# Patient Record
Sex: FEMALE | Race: WHITE | NOT HISPANIC OR LATINO | Employment: FULL TIME | URBAN - METROPOLITAN AREA
[De-identification: names, ages, dates, MRNs, and addresses within clinical notes are randomized per-mention and may not be internally consistent; named-entity substitution may affect disease eponyms.]

---

## 2017-09-08 ENCOUNTER — TRANSCRIBE ORDERS (OUTPATIENT)
Dept: ADMINISTRATIVE | Facility: HOSPITAL | Age: 62
End: 2017-09-08

## 2017-09-08 DIAGNOSIS — Z12.31 ENCOUNTER FOR SCREENING MAMMOGRAM FOR BREAST CANCER: Primary | ICD-10-CM

## 2017-11-03 ENCOUNTER — HOSPITAL ENCOUNTER (OUTPATIENT)
Dept: MAMMOGRAPHY | Facility: HOSPITAL | Age: 62
Discharge: HOME | End: 2017-11-03
Attending: FAMILY MEDICINE | Admitting: FAMILY MEDICINE

## 2017-11-03 DIAGNOSIS — Z12.31 ENCOUNTER FOR SCREENING MAMMOGRAM FOR BREAST CANCER: ICD-10-CM

## 2017-11-03 PROCEDURE — G0202 SCR MAMMO BI INCL CAD: HCPCS

## 2018-09-15 ENCOUNTER — HOSPITAL ENCOUNTER (OUTPATIENT)
Dept: GENERAL RADIOLOGY | Facility: HOSPITAL | Age: 63
Discharge: HOME | End: 2018-09-15
Attending: FAMILY MEDICINE | Admitting: FAMILY MEDICINE

## 2018-09-15 ENCOUNTER — TRANSCRIBE ORDERS (OUTPATIENT)
Dept: ADMINISTRATIVE | Facility: HOSPITAL | Age: 63
End: 2018-09-15

## 2018-09-15 DIAGNOSIS — M79.671 FOOT PAIN, RIGHT: Primary | ICD-10-CM

## 2018-09-15 DIAGNOSIS — M79.671 FOOT PAIN, RIGHT: ICD-10-CM

## 2018-09-15 PROCEDURE — 73630 X-RAY EXAM OF FOOT: CPT

## 2019-03-13 ENCOUNTER — TRANSCRIBE ORDERS (OUTPATIENT)
Dept: ADMINISTRATIVE | Facility: HOSPITAL | Age: 64
End: 2019-03-13

## 2019-03-13 DIAGNOSIS — Z12.31 SCREENING MAMMOGRAM, ENCOUNTER FOR: Primary | ICD-10-CM

## 2019-03-22 ENCOUNTER — APPOINTMENT (OUTPATIENT)
Dept: MAMMOGRAPHY | Facility: HOSPITAL | Age: 64
End: 2019-03-22

## 2019-03-29 ENCOUNTER — HOSPITAL ENCOUNTER (OUTPATIENT)
Dept: MAMMOGRAPHY | Facility: HOSPITAL | Age: 64
Discharge: HOME | End: 2019-03-29
Admitting: FAMILY MEDICINE

## 2019-03-29 DIAGNOSIS — Z12.31 SCREENING MAMMOGRAM, ENCOUNTER FOR: ICD-10-CM

## 2019-03-29 PROCEDURE — 77067 SCR MAMMO BI INCL CAD: CPT

## 2019-04-26 ENCOUNTER — TRANSCRIBE ORDERS (OUTPATIENT)
Dept: ADMINISTRATIVE | Facility: HOSPITAL | Age: 64
End: 2019-04-26

## 2019-04-26 DIAGNOSIS — Z78.0 MENOPAUSE: Primary | ICD-10-CM

## 2019-05-09 ENCOUNTER — APPOINTMENT (OUTPATIENT)
Dept: BONE DENSITY | Facility: HOSPITAL | Age: 64
End: 2019-05-09

## 2019-10-18 ENCOUNTER — TRANSCRIBE ORDERS (OUTPATIENT)
Dept: ADMINISTRATIVE | Facility: HOSPITAL | Age: 64
End: 2019-10-18

## 2019-10-18 DIAGNOSIS — Z78.0 MENOPAUSE: Primary | ICD-10-CM

## 2019-10-24 ENCOUNTER — HOSPITAL ENCOUNTER (OUTPATIENT)
Dept: BONE DENSITY | Facility: HOSPITAL | Age: 64
Discharge: HOME | End: 2019-10-24
Admitting: NURSE PRACTITIONER

## 2019-10-24 DIAGNOSIS — Z78.0 MENOPAUSE: ICD-10-CM

## 2019-10-24 PROCEDURE — 77080 DXA BONE DENSITY AXIAL: CPT

## 2020-11-13 ENCOUNTER — TRANSCRIBE ORDERS (OUTPATIENT)
Dept: ADMINISTRATIVE | Facility: HOSPITAL | Age: 65
End: 2020-11-13

## 2020-11-13 DIAGNOSIS — G89.4 CHRONIC PAIN SYNDROME: Primary | ICD-10-CM

## 2020-12-08 ENCOUNTER — APPOINTMENT (OUTPATIENT)
Dept: MRI IMAGING | Facility: HOSPITAL | Age: 65
End: 2020-12-08

## 2021-08-09 ENCOUNTER — TRANSCRIBE ORDERS (OUTPATIENT)
Dept: ADMINISTRATIVE | Facility: HOSPITAL | Age: 66
End: 2021-08-09

## 2021-08-09 ENCOUNTER — TRANSCRIBE ORDERS (OUTPATIENT)
Dept: BONE DENSITY | Facility: HOSPITAL | Age: 66
End: 2021-08-09

## 2021-08-09 DIAGNOSIS — Z12.31 ENCOUNTER FOR SCREENING MAMMOGRAM FOR BREAST CANCER: Primary | ICD-10-CM

## 2021-08-09 DIAGNOSIS — Z78.0 MENOPAUSE: ICD-10-CM

## 2021-10-26 ENCOUNTER — HOSPITAL ENCOUNTER (OUTPATIENT)
Dept: BONE DENSITY | Facility: HOSPITAL | Age: 66
Discharge: HOME | End: 2021-10-26

## 2021-10-26 ENCOUNTER — HOSPITAL ENCOUNTER (OUTPATIENT)
Dept: MAMMOGRAPHY | Facility: HOSPITAL | Age: 66
Discharge: HOME | End: 2021-10-26

## 2021-10-26 DIAGNOSIS — Z78.0 MENOPAUSE: ICD-10-CM

## 2021-10-26 DIAGNOSIS — Z12.31 ENCOUNTER FOR SCREENING MAMMOGRAM FOR BREAST CANCER: ICD-10-CM

## 2021-10-26 PROCEDURE — 77063 BREAST TOMOSYNTHESIS BI: CPT

## 2021-10-26 PROCEDURE — 77080 DXA BONE DENSITY AXIAL: CPT

## 2021-10-26 PROCEDURE — 77067 SCR MAMMO BI INCL CAD: CPT

## 2022-04-07 ENCOUNTER — HOSPITAL ENCOUNTER (OUTPATIENT)
Dept: GENERAL RADIOLOGY | Facility: HOSPITAL | Age: 67
Discharge: HOME | End: 2022-04-07
Admitting: FAMILY MEDICINE

## 2022-04-07 ENCOUNTER — TRANSCRIBE ORDERS (OUTPATIENT)
Dept: ADMINISTRATIVE | Facility: HOSPITAL | Age: 67
End: 2022-04-07

## 2022-04-07 DIAGNOSIS — M25.562 PAIN, JOINT, KNEE, LEFT: Primary | ICD-10-CM

## 2022-04-07 PROCEDURE — 73562 X-RAY EXAM OF KNEE 3: CPT

## 2022-04-13 ENCOUNTER — TRANSCRIBE ORDERS (OUTPATIENT)
Dept: CARDIOLOGY | Facility: HOSPITAL | Age: 67
End: 2022-04-13

## 2022-04-13 DIAGNOSIS — R94.31 ABNORMAL EKG: Primary | ICD-10-CM

## 2022-04-20 ENCOUNTER — HOSPITAL ENCOUNTER (OUTPATIENT)
Dept: CARDIOLOGY | Facility: HOSPITAL | Age: 67
End: 2022-04-20

## 2022-05-26 ENCOUNTER — APPOINTMENT (OUTPATIENT)
Dept: MRI IMAGING | Facility: HOSPITAL | Age: 67
End: 2022-05-26

## 2022-05-26 ENCOUNTER — HOSPITAL ENCOUNTER (OUTPATIENT)
Facility: HOSPITAL | Age: 67
Setting detail: OBSERVATION
Discharge: NURSING, REHAB OR LONG-TERM CARE | End: 2022-06-02
Attending: EMERGENCY MEDICINE | Admitting: FAMILY MEDICINE

## 2022-05-26 DIAGNOSIS — G89.4 CHRONIC PAIN SYNDROME: ICD-10-CM

## 2022-05-26 DIAGNOSIS — R29.898 LEG WEAKNESS, BILATERAL: ICD-10-CM

## 2022-05-26 DIAGNOSIS — R29.6 MULTIPLE FALLS: ICD-10-CM

## 2022-05-26 DIAGNOSIS — N39.0 URINARY TRACT INFECTION WITHOUT HEMATURIA, SITE UNSPECIFIED: Primary | ICD-10-CM

## 2022-05-26 LAB
ALBUMIN SERPL-MCNC: 4.7 G/DL (ref 3.5–5.2)
ALBUMIN/GLOB SERPL: 1.7 G/DL
ALP SERPL-CCNC: 78 U/L (ref 39–117)
ALT SERPL W P-5'-P-CCNC: 16 U/L (ref 1–33)
AMPHET+METHAMPHET UR QL: NEGATIVE
AMPHETAMINES UR QL: NEGATIVE
ANION GAP SERPL CALCULATED.3IONS-SCNC: 14.9 MMOL/L (ref 5–15)
AST SERPL-CCNC: 21 U/L (ref 1–32)
BACTERIA UR QL AUTO: ABNORMAL /HPF
BARBITURATES UR QL SCN: NEGATIVE
BASOPHILS # BLD AUTO: 0.06 10*3/MM3 (ref 0–0.2)
BASOPHILS NFR BLD AUTO: 0.5 % (ref 0–1.5)
BENZODIAZ UR QL SCN: NEGATIVE
BILIRUB SERPL-MCNC: 0.5 MG/DL (ref 0–1.2)
BILIRUB UR QL STRIP: NEGATIVE
BUN SERPL-MCNC: 34 MG/DL (ref 8–23)
BUN/CREAT SERPL: 25.2 (ref 7–25)
BUPRENORPHINE SERPL-MCNC: NEGATIVE NG/ML
CALCIUM SPEC-SCNC: 10.1 MG/DL (ref 8.6–10.5)
CANNABINOIDS SERPL QL: POSITIVE
CHLORIDE SERPL-SCNC: 97 MMOL/L (ref 98–107)
CK SERPL-CCNC: 194 U/L (ref 20–180)
CLARITY UR: ABNORMAL
CO2 SERPL-SCNC: 25.1 MMOL/L (ref 22–29)
COCAINE UR QL: NEGATIVE
COLOR UR: YELLOW
CREAT SERPL-MCNC: 1.35 MG/DL (ref 0.57–1)
DEPRECATED RDW RBC AUTO: 43.8 FL (ref 37–54)
EGFRCR SERPLBLD CKD-EPI 2021: 44 ML/MIN/1.73
EOSINOPHIL # BLD AUTO: 0.48 10*3/MM3 (ref 0–0.4)
EOSINOPHIL NFR BLD AUTO: 4.4 % (ref 0.3–6.2)
ERYTHROCYTE [DISTWIDTH] IN BLOOD BY AUTOMATED COUNT: 14 % (ref 12.3–15.4)
GLOBULIN UR ELPH-MCNC: 2.8 GM/DL
GLUCOSE BLDC GLUCOMTR-MCNC: 94 MG/DL (ref 70–130)
GLUCOSE BLDC GLUCOMTR-MCNC: 97 MG/DL (ref 70–130)
GLUCOSE SERPL-MCNC: 116 MG/DL (ref 65–99)
GLUCOSE UR STRIP-MCNC: NEGATIVE MG/DL
HCT VFR BLD AUTO: 39.3 % (ref 34–46.6)
HGB BLD-MCNC: 12.8 G/DL (ref 12–15.9)
HGB UR QL STRIP.AUTO: ABNORMAL
HYALINE CASTS UR QL AUTO: ABNORMAL /LPF
IMM GRANULOCYTES # BLD AUTO: 0.03 10*3/MM3 (ref 0–0.05)
IMM GRANULOCYTES NFR BLD AUTO: 0.3 % (ref 0–0.5)
KETONES UR QL STRIP: NEGATIVE
LEUKOCYTE ESTERASE UR QL STRIP.AUTO: ABNORMAL
LYMPHOCYTES # BLD AUTO: 2.78 10*3/MM3 (ref 0.7–3.1)
LYMPHOCYTES NFR BLD AUTO: 25.3 % (ref 19.6–45.3)
MCH RBC QN AUTO: 28.3 PG (ref 26.6–33)
MCHC RBC AUTO-ENTMCNC: 32.6 G/DL (ref 31.5–35.7)
MCV RBC AUTO: 86.8 FL (ref 79–97)
METHADONE UR QL SCN: NEGATIVE
MONOCYTES # BLD AUTO: 0.94 10*3/MM3 (ref 0.1–0.9)
MONOCYTES NFR BLD AUTO: 8.6 % (ref 5–12)
NEUTROPHILS NFR BLD AUTO: 6.7 10*3/MM3 (ref 1.7–7)
NEUTROPHILS NFR BLD AUTO: 60.9 % (ref 42.7–76)
NITRITE UR QL STRIP: NEGATIVE
NRBC BLD AUTO-RTO: 0 /100 WBC (ref 0–0.2)
OPIATES UR QL: POSITIVE
OXYCODONE UR QL SCN: NEGATIVE
PCP UR QL SCN: NEGATIVE
PH UR STRIP.AUTO: 5.5 [PH] (ref 4.5–8)
PLATELET # BLD AUTO: 253 10*3/MM3 (ref 140–450)
PMV BLD AUTO: 8.9 FL (ref 6–12)
POTASSIUM SERPL-SCNC: 3.2 MMOL/L (ref 3.5–5.2)
PROCALCITONIN SERPL-MCNC: 0.08 NG/ML (ref 0–0.25)
PROPOXYPH UR QL: NEGATIVE
PROT SERPL-MCNC: 7.5 G/DL (ref 6–8.5)
PROT UR QL STRIP: NEGATIVE
RBC # BLD AUTO: 4.53 10*6/MM3 (ref 3.77–5.28)
RBC # UR STRIP: ABNORMAL /HPF
REF LAB TEST METHOD: ABNORMAL
SARS-COV-2 RNA PNL SPEC NAA+PROBE: NOT DETECTED
SODIUM SERPL-SCNC: 137 MMOL/L (ref 136–145)
SP GR UR STRIP: 1.02 (ref 1–1.03)
SQUAMOUS #/AREA URNS HPF: ABNORMAL /HPF
TRICYCLICS UR QL SCN: POSITIVE
TROPONIN T SERPL-MCNC: <0.01 NG/ML (ref 0–0.03)
TSH SERPL DL<=0.05 MIU/L-ACNC: 6.7 UIU/ML (ref 0.27–4.2)
UROBILINOGEN UR QL STRIP: ABNORMAL
WBC # UR STRIP: ABNORMAL /HPF
WBC NRBC COR # BLD: 10.99 10*3/MM3 (ref 3.4–10.8)

## 2022-05-26 PROCEDURE — 25010000002 LORAZEPAM PER 2 MG

## 2022-05-26 PROCEDURE — 99284 EMERGENCY DEPT VISIT MOD MDM: CPT

## 2022-05-26 PROCEDURE — 93010 ELECTROCARDIOGRAM REPORT: CPT | Performed by: INTERNAL MEDICINE

## 2022-05-26 PROCEDURE — 96361 HYDRATE IV INFUSION ADD-ON: CPT

## 2022-05-26 PROCEDURE — 85025 COMPLETE CBC W/AUTO DIFF WBC: CPT | Performed by: EMERGENCY MEDICINE

## 2022-05-26 PROCEDURE — 87635 SARS-COV-2 COVID-19 AMP PRB: CPT | Performed by: EMERGENCY MEDICINE

## 2022-05-26 PROCEDURE — 82962 GLUCOSE BLOOD TEST: CPT

## 2022-05-26 PROCEDURE — C9803 HOPD COVID-19 SPEC COLLECT: HCPCS

## 2022-05-26 PROCEDURE — 25010000002 CEFTRIAXONE SODIUM-DEXTROSE 1-3.74 GM-%(50ML) RECONSTITUTED SOLUTION: Performed by: EMERGENCY MEDICINE

## 2022-05-26 PROCEDURE — G0378 HOSPITAL OBSERVATION PER HR: HCPCS

## 2022-05-26 PROCEDURE — 96375 TX/PRO/DX INJ NEW DRUG ADDON: CPT

## 2022-05-26 PROCEDURE — 81001 URINALYSIS AUTO W/SCOPE: CPT | Performed by: EMERGENCY MEDICINE

## 2022-05-26 PROCEDURE — 80306 DRUG TEST PRSMV INSTRMNT: CPT | Performed by: EMERGENCY MEDICINE

## 2022-05-26 PROCEDURE — 97161 PT EVAL LOW COMPLEX 20 MIN: CPT

## 2022-05-26 PROCEDURE — 82550 ASSAY OF CK (CPK): CPT | Performed by: EMERGENCY MEDICINE

## 2022-05-26 PROCEDURE — 87186 SC STD MICRODIL/AGAR DIL: CPT | Performed by: FAMILY MEDICINE

## 2022-05-26 PROCEDURE — 96372 THER/PROPH/DIAG INJ SC/IM: CPT

## 2022-05-26 PROCEDURE — 84145 PROCALCITONIN (PCT): CPT | Performed by: EMERGENCY MEDICINE

## 2022-05-26 PROCEDURE — 96365 THER/PROPH/DIAG IV INF INIT: CPT

## 2022-05-26 PROCEDURE — 72148 MRI LUMBAR SPINE W/O DYE: CPT

## 2022-05-26 PROCEDURE — 99283 EMERGENCY DEPT VISIT LOW MDM: CPT | Performed by: EMERGENCY MEDICINE

## 2022-05-26 PROCEDURE — 99204 OFFICE O/P NEW MOD 45 MIN: CPT | Performed by: PSYCHIATRY & NEUROLOGY

## 2022-05-26 PROCEDURE — 25010000002 ENOXAPARIN PER 10 MG: Performed by: FAMILY MEDICINE

## 2022-05-26 PROCEDURE — 97165 OT EVAL LOW COMPLEX 30 MIN: CPT

## 2022-05-26 PROCEDURE — 93005 ELECTROCARDIOGRAM TRACING: CPT | Performed by: EMERGENCY MEDICINE

## 2022-05-26 PROCEDURE — 80053 COMPREHEN METABOLIC PANEL: CPT | Performed by: EMERGENCY MEDICINE

## 2022-05-26 PROCEDURE — 87086 URINE CULTURE/COLONY COUNT: CPT | Performed by: FAMILY MEDICINE

## 2022-05-26 PROCEDURE — 84443 ASSAY THYROID STIM HORMONE: CPT | Performed by: FAMILY MEDICINE

## 2022-05-26 PROCEDURE — 96376 TX/PRO/DX INJ SAME DRUG ADON: CPT

## 2022-05-26 PROCEDURE — 84484 ASSAY OF TROPONIN QUANT: CPT | Performed by: EMERGENCY MEDICINE

## 2022-05-26 RX ORDER — ONDANSETRON 2 MG/ML
4 INJECTION INTRAMUSCULAR; INTRAVENOUS EVERY 6 HOURS PRN
Status: DISCONTINUED | OUTPATIENT
Start: 2022-05-26 | End: 2022-06-02 | Stop reason: HOSPADM

## 2022-05-26 RX ORDER — MAGNESIUM CARB/ALUMINUM HYDROX 105-160MG
TABLET,CHEWABLE ORAL ONCE
COMMUNITY
End: 2022-05-26

## 2022-05-26 RX ORDER — CHLORAL HYDRATE 500 MG
1 CAPSULE ORAL
COMMUNITY

## 2022-05-26 RX ORDER — OXYBUTYNIN CHLORIDE 5 MG/1
5 TABLET ORAL 4 TIMES DAILY
COMMUNITY

## 2022-05-26 RX ORDER — LEVOTHYROXINE SODIUM 0.1 MG/1
150 TABLET ORAL DAILY
COMMUNITY

## 2022-05-26 RX ORDER — LORAZEPAM 2 MG/ML
1 INJECTION INTRAMUSCULAR ONCE
Status: COMPLETED | OUTPATIENT
Start: 2022-05-26 | End: 2022-05-26

## 2022-05-26 RX ORDER — HYDROCODONE BITARTRATE AND ACETAMINOPHEN 7.5; 325 MG/1; MG/1
1 TABLET ORAL EVERY 8 HOURS PRN
Status: ON HOLD | COMMUNITY
End: 2022-06-02 | Stop reason: SDUPTHER

## 2022-05-26 RX ORDER — METFORMIN HYDROCHLORIDE 750 MG/1
750 TABLET, EXTENDED RELEASE ORAL
COMMUNITY

## 2022-05-26 RX ORDER — ACETAMINOPHEN 650 MG/1
650 SUPPOSITORY RECTAL EVERY 4 HOURS PRN
Status: DISCONTINUED | OUTPATIENT
Start: 2022-05-26 | End: 2022-06-02 | Stop reason: HOSPADM

## 2022-05-26 RX ORDER — CEFTRIAXONE 1 G/50ML
1 INJECTION, SOLUTION INTRAVENOUS ONCE
Status: COMPLETED | OUTPATIENT
Start: 2022-05-26 | End: 2022-05-26

## 2022-05-26 RX ORDER — CLONAZEPAM 0.5 MG/1
0.5 TABLET ORAL 2 TIMES DAILY
Status: COMPLETED | OUTPATIENT
Start: 2022-05-26 | End: 2022-06-02

## 2022-05-26 RX ORDER — ACETAMINOPHEN 160 MG/5ML
650 SOLUTION ORAL EVERY 4 HOURS PRN
Status: DISCONTINUED | OUTPATIENT
Start: 2022-05-26 | End: 2022-06-02 | Stop reason: HOSPADM

## 2022-05-26 RX ORDER — OXYBUTYNIN CHLORIDE 5 MG/1
15 TABLET, EXTENDED RELEASE ORAL NIGHTLY
Status: DISCONTINUED | OUTPATIENT
Start: 2022-05-26 | End: 2022-06-02 | Stop reason: HOSPADM

## 2022-05-26 RX ORDER — OXYBUTYNIN CHLORIDE 5 MG/1
5 TABLET ORAL 4 TIMES DAILY
Status: DISCONTINUED | OUTPATIENT
Start: 2022-05-26 | End: 2022-05-26

## 2022-05-26 RX ORDER — HYDROCODONE BITARTRATE AND ACETAMINOPHEN 7.5; 325 MG/1; MG/1
1 TABLET ORAL EVERY 8 HOURS PRN
Status: DISCONTINUED | OUTPATIENT
Start: 2022-05-26 | End: 2022-06-02 | Stop reason: HOSPADM

## 2022-05-26 RX ORDER — SODIUM CHLORIDE 0.9 % (FLUSH) 0.9 %
10 SYRINGE (ML) INJECTION AS NEEDED
Status: DISCONTINUED | OUTPATIENT
Start: 2022-05-26 | End: 2022-06-02 | Stop reason: HOSPADM

## 2022-05-26 RX ORDER — ATENOLOL 50 MG/1
50 TABLET ORAL DAILY
COMMUNITY

## 2022-05-26 RX ORDER — MULTIVITAMIN WITH IRON
1 TABLET ORAL
COMMUNITY

## 2022-05-26 RX ORDER — ASPIRIN 81 MG/1
81 TABLET, CHEWABLE ORAL DAILY
Status: DISCONTINUED | OUTPATIENT
Start: 2022-05-26 | End: 2022-06-02 | Stop reason: HOSPADM

## 2022-05-26 RX ORDER — LORAZEPAM 2 MG/ML
INJECTION INTRAMUSCULAR
Status: COMPLETED
Start: 2022-05-26 | End: 2022-05-26

## 2022-05-26 RX ORDER — SODIUM CHLORIDE 0.9 % (FLUSH) 0.9 %
1-10 SYRINGE (ML) INJECTION AS NEEDED
Status: DISCONTINUED | OUTPATIENT
Start: 2022-05-26 | End: 2022-06-02 | Stop reason: HOSPADM

## 2022-05-26 RX ORDER — CHOLECALCIFEROL (VITAMIN D3) 125 MCG
5 CAPSULE ORAL NIGHTLY PRN
Status: DISCONTINUED | OUTPATIENT
Start: 2022-05-26 | End: 2022-06-02 | Stop reason: HOSPADM

## 2022-05-26 RX ORDER — CHLORTHALIDONE 25 MG/1
25 TABLET ORAL DAILY
COMMUNITY
End: 2022-06-02 | Stop reason: HOSPADM

## 2022-05-26 RX ORDER — CYCLOBENZAPRINE HCL 10 MG
10 TABLET ORAL 3 TIMES DAILY PRN
COMMUNITY
End: 2022-08-29

## 2022-05-26 RX ORDER — CYCLOBENZAPRINE HCL 10 MG
10 TABLET ORAL 3 TIMES DAILY PRN
Status: DISCONTINUED | OUTPATIENT
Start: 2022-05-26 | End: 2022-06-02 | Stop reason: HOSPADM

## 2022-05-26 RX ORDER — GABAPENTIN 300 MG/1
300 CAPSULE ORAL 2 TIMES DAILY
Status: DISCONTINUED | OUTPATIENT
Start: 2022-05-26 | End: 2022-05-26

## 2022-05-26 RX ORDER — UBIDECARENONE 100 MG
100 CAPSULE ORAL DAILY
COMMUNITY

## 2022-05-26 RX ORDER — ONDANSETRON 4 MG/1
4 TABLET, FILM COATED ORAL EVERY 6 HOURS PRN
Status: DISCONTINUED | OUTPATIENT
Start: 2022-05-26 | End: 2022-06-02 | Stop reason: HOSPADM

## 2022-05-26 RX ORDER — ENOXAPARIN SODIUM 100 MG/ML
40 INJECTION SUBCUTANEOUS EVERY 24 HOURS
Status: DISCONTINUED | OUTPATIENT
Start: 2022-05-26 | End: 2022-06-02 | Stop reason: HOSPADM

## 2022-05-26 RX ORDER — GABAPENTIN 300 MG/1
600 CAPSULE ORAL NIGHTLY
Status: DISCONTINUED | OUTPATIENT
Start: 2022-05-26 | End: 2022-06-02 | Stop reason: HOSPADM

## 2022-05-26 RX ORDER — ATENOLOL 50 MG/1
50 TABLET ORAL DAILY
Status: DISCONTINUED | OUTPATIENT
Start: 2022-05-26 | End: 2022-06-02 | Stop reason: HOSPADM

## 2022-05-26 RX ORDER — ACETAMINOPHEN 325 MG/1
650 TABLET ORAL EVERY 4 HOURS PRN
Status: DISCONTINUED | OUTPATIENT
Start: 2022-05-26 | End: 2022-06-02 | Stop reason: HOSPADM

## 2022-05-26 RX ORDER — SODIUM CHLORIDE 9 MG/ML
40 INJECTION, SOLUTION INTRAVENOUS AS NEEDED
Status: DISCONTINUED | OUTPATIENT
Start: 2022-05-26 | End: 2022-06-02 | Stop reason: HOSPADM

## 2022-05-26 RX ORDER — POTASSIUM CHLORIDE 20 MEQ/1
40 TABLET, EXTENDED RELEASE ORAL ONCE
Status: COMPLETED | OUTPATIENT
Start: 2022-05-26 | End: 2022-05-26

## 2022-05-26 RX ORDER — SODIUM CHLORIDE 0.9 % (FLUSH) 0.9 %
10 SYRINGE (ML) INJECTION EVERY 12 HOURS SCHEDULED
Status: DISCONTINUED | OUTPATIENT
Start: 2022-05-26 | End: 2022-06-02 | Stop reason: HOSPADM

## 2022-05-26 RX ORDER — GABAPENTIN 300 MG/1
300 CAPSULE ORAL 2 TIMES DAILY
COMMUNITY
End: 2022-06-02 | Stop reason: HOSPADM

## 2022-05-26 RX ORDER — LEVOTHYROXINE SODIUM 0.15 MG/1
150 TABLET ORAL DAILY
Status: DISCONTINUED | OUTPATIENT
Start: 2022-05-26 | End: 2022-06-02 | Stop reason: HOSPADM

## 2022-05-26 RX ORDER — ASPIRIN 81 MG/1
81 TABLET, CHEWABLE ORAL DAILY
COMMUNITY

## 2022-05-26 RX ORDER — ALUMINA, MAGNESIA, AND SIMETHICONE 2400; 2400; 240 MG/30ML; MG/30ML; MG/30ML
15 SUSPENSION ORAL EVERY 6 HOURS PRN
Status: DISCONTINUED | OUTPATIENT
Start: 2022-05-26 | End: 2022-06-02 | Stop reason: HOSPADM

## 2022-05-26 RX ORDER — AMOXICILLIN 250 MG
2 CAPSULE ORAL 2 TIMES DAILY
Status: DISCONTINUED | OUTPATIENT
Start: 2022-05-26 | End: 2022-06-02 | Stop reason: HOSPADM

## 2022-05-26 RX ORDER — POLYETHYLENE GLYCOL 3350 17 G/17G
17 POWDER, FOR SOLUTION ORAL DAILY PRN
Status: DISCONTINUED | OUTPATIENT
Start: 2022-05-26 | End: 2022-06-02 | Stop reason: HOSPADM

## 2022-05-26 RX ORDER — NAPROXEN SODIUM 220 MG
220 TABLET ORAL 2 TIMES DAILY PRN
COMMUNITY

## 2022-05-26 RX ORDER — BISACODYL 5 MG/1
5 TABLET, DELAYED RELEASE ORAL DAILY PRN
Status: DISCONTINUED | OUTPATIENT
Start: 2022-05-26 | End: 2022-06-02 | Stop reason: HOSPADM

## 2022-05-26 RX ORDER — CEFTRIAXONE 1 G/50ML
1 INJECTION, SOLUTION INTRAVENOUS EVERY 24 HOURS
Status: DISCONTINUED | OUTPATIENT
Start: 2022-05-27 | End: 2022-05-28

## 2022-05-26 RX ORDER — BISACODYL 10 MG
10 SUPPOSITORY, RECTAL RECTAL DAILY PRN
Status: DISCONTINUED | OUTPATIENT
Start: 2022-05-26 | End: 2022-06-02 | Stop reason: HOSPADM

## 2022-05-26 RX ORDER — METFORMIN HYDROCHLORIDE 750 MG/1
750 TABLET, EXTENDED RELEASE ORAL
Status: DISCONTINUED | OUTPATIENT
Start: 2022-05-26 | End: 2022-06-02 | Stop reason: HOSPADM

## 2022-05-26 RX ORDER — SODIUM CHLORIDE 9 MG/ML
100 INJECTION, SOLUTION INTRAVENOUS CONTINUOUS
Status: DISCONTINUED | OUTPATIENT
Start: 2022-05-26 | End: 2022-05-27

## 2022-05-26 RX ADMIN — CYCLOBENZAPRINE 10 MG: 10 TABLET, FILM COATED ORAL at 16:39

## 2022-05-26 RX ADMIN — CEFTRIAXONE 1 G: 1 INJECTION, SOLUTION INTRAVENOUS at 05:48

## 2022-05-26 RX ADMIN — SENNOSIDES AND DOCUSATE SODIUM 2 TABLET: 50; 8.6 TABLET ORAL at 23:31

## 2022-05-26 RX ADMIN — ENOXAPARIN SODIUM 40 MG: 40 INJECTION SUBCUTANEOUS at 10:24

## 2022-05-26 RX ADMIN — CEFTRIAXONE 1 G: 1 INJECTION, SOLUTION INTRAVENOUS at 23:28

## 2022-05-26 RX ADMIN — SODIUM CHLORIDE 100 ML/HR: 9 INJECTION, SOLUTION INTRAVENOUS at 22:40

## 2022-05-26 RX ADMIN — LORAZEPAM 1 MG: 2 INJECTION INTRAMUSCULAR at 15:49

## 2022-05-26 RX ADMIN — HYDROCODONE BITARTRATE AND ACETAMINOPHEN 1 TABLET: 7.5; 325 TABLET ORAL at 08:28

## 2022-05-26 RX ADMIN — GABAPENTIN 600 MG: 300 CAPSULE ORAL at 23:31

## 2022-05-26 RX ADMIN — ASPIRIN 81 MG CHEWABLE TABLET 81 MG: 81 TABLET CHEWABLE at 10:24

## 2022-05-26 RX ADMIN — SENNOSIDES AND DOCUSATE SODIUM 2 TABLET: 50; 8.6 TABLET ORAL at 10:24

## 2022-05-26 RX ADMIN — HYDROCODONE BITARTRATE AND ACETAMINOPHEN 1 TABLET: 7.5; 325 TABLET ORAL at 16:39

## 2022-05-26 RX ADMIN — SODIUM CHLORIDE 100 ML/HR: 9 INJECTION, SOLUTION INTRAVENOUS at 10:24

## 2022-05-26 RX ADMIN — OXYBUTYNIN CHLORIDE 15 MG: 5 TABLET, EXTENDED RELEASE ORAL at 23:31

## 2022-05-26 RX ADMIN — LORAZEPAM 1 MG: 2 INJECTION INTRAMUSCULAR; INTRAVENOUS at 15:49

## 2022-05-26 RX ADMIN — CLONAZEPAM 0.5 MG: 0.5 TABLET ORAL at 23:31

## 2022-05-26 RX ADMIN — ATENOLOL 50 MG: 50 TABLET ORAL at 10:23

## 2022-05-26 RX ADMIN — METFORMIN HYDROCHLORIDE 750 MG: 750 TABLET ORAL at 10:24

## 2022-05-26 RX ADMIN — POTASSIUM CHLORIDE 40 MEQ: 1500 TABLET, EXTENDED RELEASE ORAL at 10:23

## 2022-05-26 RX ADMIN — Medication 10 ML: at 10:25

## 2022-05-26 RX ADMIN — LEVOTHYROXINE SODIUM 150 MCG: 150 TABLET ORAL at 10:24

## 2022-05-26 RX ADMIN — CYCLOBENZAPRINE 10 MG: 10 TABLET, FILM COATED ORAL at 08:59

## 2022-05-27 ENCOUNTER — APPOINTMENT (OUTPATIENT)
Dept: MRI IMAGING | Facility: HOSPITAL | Age: 67
End: 2022-05-27

## 2022-05-27 LAB
ANION GAP SERPL CALCULATED.3IONS-SCNC: 11.9 MMOL/L (ref 5–15)
BUN SERPL-MCNC: 28 MG/DL (ref 8–23)
BUN/CREAT SERPL: 22.4 (ref 7–25)
CALCIUM SPEC-SCNC: 9 MG/DL (ref 8.6–10.5)
CHLORIDE SERPL-SCNC: 105 MMOL/L (ref 98–107)
CO2 SERPL-SCNC: 24.1 MMOL/L (ref 22–29)
CREAT SERPL-MCNC: 1.25 MG/DL (ref 0.57–1)
DEPRECATED RDW RBC AUTO: 45.3 FL (ref 37–54)
EGFRCR SERPLBLD CKD-EPI 2021: 48.2 ML/MIN/1.73
ERYTHROCYTE [DISTWIDTH] IN BLOOD BY AUTOMATED COUNT: 14.1 % (ref 12.3–15.4)
GLUCOSE BLDC GLUCOMTR-MCNC: 81 MG/DL (ref 70–130)
GLUCOSE BLDC GLUCOMTR-MCNC: 91 MG/DL (ref 70–130)
GLUCOSE BLDC GLUCOMTR-MCNC: 99 MG/DL (ref 70–130)
GLUCOSE SERPL-MCNC: 103 MG/DL (ref 65–99)
HCT VFR BLD AUTO: 35.1 % (ref 34–46.6)
HGB BLD-MCNC: 11 G/DL (ref 12–15.9)
MCH RBC QN AUTO: 27.8 PG (ref 26.6–33)
MCHC RBC AUTO-ENTMCNC: 31.3 G/DL (ref 31.5–35.7)
MCV RBC AUTO: 88.9 FL (ref 79–97)
PLATELET # BLD AUTO: 204 10*3/MM3 (ref 140–450)
PMV BLD AUTO: 9.2 FL (ref 6–12)
POTASSIUM SERPL-SCNC: 3.6 MMOL/L (ref 3.5–5.2)
RBC # BLD AUTO: 3.95 10*6/MM3 (ref 3.77–5.28)
SODIUM SERPL-SCNC: 141 MMOL/L (ref 136–145)
WBC NRBC COR # BLD: 6.24 10*3/MM3 (ref 3.4–10.8)

## 2022-05-27 PROCEDURE — 80048 BASIC METABOLIC PNL TOTAL CA: CPT | Performed by: FAMILY MEDICINE

## 2022-05-27 PROCEDURE — 25010000002 CEFTRIAXONE SODIUM-DEXTROSE 1-3.74 GM-%(50ML) RECONSTITUTED SOLUTION: Performed by: FAMILY MEDICINE

## 2022-05-27 PROCEDURE — 97530 THERAPEUTIC ACTIVITIES: CPT

## 2022-05-27 PROCEDURE — 96372 THER/PROPH/DIAG INJ SC/IM: CPT

## 2022-05-27 PROCEDURE — G0378 HOSPITAL OBSERVATION PER HR: HCPCS

## 2022-05-27 PROCEDURE — 82962 GLUCOSE BLOOD TEST: CPT

## 2022-05-27 PROCEDURE — 25010000002 LORAZEPAM PER 2 MG: Performed by: PSYCHIATRY & NEUROLOGY

## 2022-05-27 PROCEDURE — 96361 HYDRATE IV INFUSION ADD-ON: CPT

## 2022-05-27 PROCEDURE — 97110 THERAPEUTIC EXERCISES: CPT

## 2022-05-27 PROCEDURE — 25010000002 LORAZEPAM PER 2 MG: Performed by: FAMILY MEDICINE

## 2022-05-27 PROCEDURE — 25010000002 ENOXAPARIN PER 10 MG: Performed by: FAMILY MEDICINE

## 2022-05-27 PROCEDURE — 96366 THER/PROPH/DIAG IV INF ADDON: CPT

## 2022-05-27 PROCEDURE — 72146 MRI CHEST SPINE W/O DYE: CPT

## 2022-05-27 PROCEDURE — 96376 TX/PRO/DX INJ SAME DRUG ADON: CPT

## 2022-05-27 PROCEDURE — 85027 COMPLETE CBC AUTOMATED: CPT | Performed by: FAMILY MEDICINE

## 2022-05-27 RX ORDER — LORAZEPAM 2 MG/ML
1 INJECTION INTRAMUSCULAR ONCE
Status: COMPLETED | OUTPATIENT
Start: 2022-05-27 | End: 2022-05-27

## 2022-05-27 RX ADMIN — LEVOTHYROXINE SODIUM 150 MCG: 150 TABLET ORAL at 08:46

## 2022-05-27 RX ADMIN — LORAZEPAM 1 MG: 2 INJECTION INTRAMUSCULAR; INTRAVENOUS at 11:35

## 2022-05-27 RX ADMIN — LORAZEPAM 1 MG: 2 INJECTION INTRAMUSCULAR; INTRAVENOUS at 10:55

## 2022-05-27 RX ADMIN — SENNOSIDES AND DOCUSATE SODIUM 2 TABLET: 50; 8.6 TABLET ORAL at 20:09

## 2022-05-27 RX ADMIN — HYDROCODONE BITARTRATE AND ACETAMINOPHEN 1 TABLET: 7.5; 325 TABLET ORAL at 10:34

## 2022-05-27 RX ADMIN — ASPIRIN 81 MG CHEWABLE TABLET 81 MG: 81 TABLET CHEWABLE at 08:46

## 2022-05-27 RX ADMIN — CLONAZEPAM 0.5 MG: 0.5 TABLET ORAL at 08:46

## 2022-05-27 RX ADMIN — SODIUM CHLORIDE 100 ML/HR: 9 INJECTION, SOLUTION INTRAVENOUS at 05:57

## 2022-05-27 RX ADMIN — HYDROCODONE BITARTRATE AND ACETAMINOPHEN 1 TABLET: 7.5; 325 TABLET ORAL at 00:15

## 2022-05-27 RX ADMIN — GABAPENTIN 600 MG: 300 CAPSULE ORAL at 20:09

## 2022-05-27 RX ADMIN — Medication 10 ML: at 20:09

## 2022-05-27 RX ADMIN — SENNOSIDES AND DOCUSATE SODIUM 2 TABLET: 50; 8.6 TABLET ORAL at 08:46

## 2022-05-27 RX ADMIN — METFORMIN HYDROCHLORIDE 750 MG: 750 TABLET ORAL at 08:46

## 2022-05-27 RX ADMIN — Medication 10 ML: at 08:47

## 2022-05-27 RX ADMIN — ATENOLOL 50 MG: 50 TABLET ORAL at 08:46

## 2022-05-27 RX ADMIN — CEFTRIAXONE 1 G: 1 INJECTION, SOLUTION INTRAVENOUS at 23:24

## 2022-05-27 RX ADMIN — ENOXAPARIN SODIUM 40 MG: 40 INJECTION SUBCUTANEOUS at 06:03

## 2022-05-27 RX ADMIN — OXYBUTYNIN CHLORIDE 15 MG: 5 TABLET, EXTENDED RELEASE ORAL at 20:09

## 2022-05-27 RX ADMIN — CLONAZEPAM 0.5 MG: 0.5 TABLET ORAL at 20:09

## 2022-05-28 LAB
BACTERIA SPEC AEROBE CULT: ABNORMAL
GLUCOSE BLDC GLUCOMTR-MCNC: 106 MG/DL (ref 70–130)
GLUCOSE BLDC GLUCOMTR-MCNC: 107 MG/DL (ref 70–130)
GLUCOSE BLDC GLUCOMTR-MCNC: 108 MG/DL (ref 70–130)

## 2022-05-28 PROCEDURE — G0378 HOSPITAL OBSERVATION PER HR: HCPCS

## 2022-05-28 PROCEDURE — 96372 THER/PROPH/DIAG INJ SC/IM: CPT

## 2022-05-28 PROCEDURE — 97116 GAIT TRAINING THERAPY: CPT

## 2022-05-28 PROCEDURE — 25010000002 ENOXAPARIN PER 10 MG: Performed by: FAMILY MEDICINE

## 2022-05-28 PROCEDURE — 82962 GLUCOSE BLOOD TEST: CPT

## 2022-05-28 RX ORDER — CEPHALEXIN 500 MG/1
500 CAPSULE ORAL 3 TIMES DAILY
Status: COMPLETED | OUTPATIENT
Start: 2022-05-28 | End: 2022-06-02

## 2022-05-28 RX ADMIN — Medication 10 ML: at 08:12

## 2022-05-28 RX ADMIN — LEVOTHYROXINE SODIUM 150 MCG: 150 TABLET ORAL at 08:11

## 2022-05-28 RX ADMIN — CYCLOBENZAPRINE 10 MG: 10 TABLET, FILM COATED ORAL at 18:08

## 2022-05-28 RX ADMIN — HYDROCODONE BITARTRATE AND ACETAMINOPHEN 1 TABLET: 7.5; 325 TABLET ORAL at 11:53

## 2022-05-28 RX ADMIN — GABAPENTIN 600 MG: 300 CAPSULE ORAL at 20:11

## 2022-05-28 RX ADMIN — CEPHALEXIN 500 MG: 500 CAPSULE ORAL at 20:11

## 2022-05-28 RX ADMIN — OXYBUTYNIN CHLORIDE 15 MG: 5 TABLET, EXTENDED RELEASE ORAL at 20:11

## 2022-05-28 RX ADMIN — CLONAZEPAM 0.5 MG: 0.5 TABLET ORAL at 08:11

## 2022-05-28 RX ADMIN — SENNOSIDES AND DOCUSATE SODIUM 2 TABLET: 50; 8.6 TABLET ORAL at 20:11

## 2022-05-28 RX ADMIN — HYDROCODONE BITARTRATE AND ACETAMINOPHEN 1 TABLET: 7.5; 325 TABLET ORAL at 03:39

## 2022-05-28 RX ADMIN — ENOXAPARIN SODIUM 40 MG: 40 INJECTION SUBCUTANEOUS at 08:11

## 2022-05-28 RX ADMIN — HYDROCODONE BITARTRATE AND ACETAMINOPHEN 1 TABLET: 7.5; 325 TABLET ORAL at 20:15

## 2022-05-28 RX ADMIN — SENNOSIDES AND DOCUSATE SODIUM 2 TABLET: 50; 8.6 TABLET ORAL at 08:11

## 2022-05-28 RX ADMIN — Medication 10 ML: at 20:12

## 2022-05-28 RX ADMIN — METFORMIN HYDROCHLORIDE 750 MG: 750 TABLET ORAL at 08:11

## 2022-05-28 RX ADMIN — CEPHALEXIN 500 MG: 500 CAPSULE ORAL at 16:06

## 2022-05-28 RX ADMIN — CLONAZEPAM 0.5 MG: 0.5 TABLET ORAL at 20:11

## 2022-05-28 RX ADMIN — ATENOLOL 50 MG: 50 TABLET ORAL at 08:11

## 2022-05-28 RX ADMIN — ASPIRIN 81 MG CHEWABLE TABLET 81 MG: 81 TABLET CHEWABLE at 08:11

## 2022-05-29 LAB
ANION GAP SERPL CALCULATED.3IONS-SCNC: 14.8 MMOL/L (ref 5–15)
BUN SERPL-MCNC: 21 MG/DL (ref 8–23)
BUN/CREAT SERPL: 17.5 (ref 7–25)
CALCIUM SPEC-SCNC: 9.6 MG/DL (ref 8.6–10.5)
CHLORIDE SERPL-SCNC: 103 MMOL/L (ref 98–107)
CO2 SERPL-SCNC: 20.2 MMOL/L (ref 22–29)
CREAT SERPL-MCNC: 1.2 MG/DL (ref 0.57–1)
DEPRECATED RDW RBC AUTO: 45.2 FL (ref 37–54)
EGFRCR SERPLBLD CKD-EPI 2021: 50.7 ML/MIN/1.73
ERYTHROCYTE [DISTWIDTH] IN BLOOD BY AUTOMATED COUNT: 14 % (ref 12.3–15.4)
GLUCOSE SERPL-MCNC: 98 MG/DL (ref 65–99)
HCT VFR BLD AUTO: 36.9 % (ref 34–46.6)
HGB BLD-MCNC: 11.6 G/DL (ref 12–15.9)
MCH RBC QN AUTO: 28.1 PG (ref 26.6–33)
MCHC RBC AUTO-ENTMCNC: 31.4 G/DL (ref 31.5–35.7)
MCV RBC AUTO: 89.3 FL (ref 79–97)
PLATELET # BLD AUTO: 202 10*3/MM3 (ref 140–450)
PMV BLD AUTO: 9.3 FL (ref 6–12)
POTASSIUM SERPL-SCNC: 3.7 MMOL/L (ref 3.5–5.2)
RBC # BLD AUTO: 4.13 10*6/MM3 (ref 3.77–5.28)
SODIUM SERPL-SCNC: 138 MMOL/L (ref 136–145)
WBC NRBC COR # BLD: 7.56 10*3/MM3 (ref 3.4–10.8)

## 2022-05-29 PROCEDURE — 80048 BASIC METABOLIC PNL TOTAL CA: CPT | Performed by: FAMILY MEDICINE

## 2022-05-29 PROCEDURE — 25010000002 ENOXAPARIN PER 10 MG: Performed by: FAMILY MEDICINE

## 2022-05-29 PROCEDURE — 96372 THER/PROPH/DIAG INJ SC/IM: CPT

## 2022-05-29 PROCEDURE — 97116 GAIT TRAINING THERAPY: CPT | Performed by: PHYSICAL THERAPIST

## 2022-05-29 PROCEDURE — G0378 HOSPITAL OBSERVATION PER HR: HCPCS

## 2022-05-29 PROCEDURE — 85027 COMPLETE CBC AUTOMATED: CPT | Performed by: FAMILY MEDICINE

## 2022-05-29 RX ADMIN — SENNOSIDES AND DOCUSATE SODIUM 2 TABLET: 50; 8.6 TABLET ORAL at 09:32

## 2022-05-29 RX ADMIN — Medication 10 ML: at 21:13

## 2022-05-29 RX ADMIN — OXYBUTYNIN CHLORIDE 15 MG: 5 TABLET, EXTENDED RELEASE ORAL at 21:09

## 2022-05-29 RX ADMIN — METFORMIN HYDROCHLORIDE 750 MG: 750 TABLET ORAL at 09:33

## 2022-05-29 RX ADMIN — SENNOSIDES AND DOCUSATE SODIUM 2 TABLET: 50; 8.6 TABLET ORAL at 21:07

## 2022-05-29 RX ADMIN — ATENOLOL 50 MG: 50 TABLET ORAL at 09:32

## 2022-05-29 RX ADMIN — CEPHALEXIN 500 MG: 500 CAPSULE ORAL at 21:07

## 2022-05-29 RX ADMIN — HYDROCODONE BITARTRATE AND ACETAMINOPHEN 1 TABLET: 7.5; 325 TABLET ORAL at 09:32

## 2022-05-29 RX ADMIN — CLONAZEPAM 0.5 MG: 0.5 TABLET ORAL at 21:07

## 2022-05-29 RX ADMIN — MELATONIN TAB 5 MG 5 MG: 5 TAB at 21:08

## 2022-05-29 RX ADMIN — CLONAZEPAM 0.5 MG: 0.5 TABLET ORAL at 09:33

## 2022-05-29 RX ADMIN — CYCLOBENZAPRINE 10 MG: 10 TABLET, FILM COATED ORAL at 12:48

## 2022-05-29 RX ADMIN — CEPHALEXIN 500 MG: 500 CAPSULE ORAL at 09:32

## 2022-05-29 RX ADMIN — CEPHALEXIN 500 MG: 500 CAPSULE ORAL at 16:57

## 2022-05-29 RX ADMIN — ASPIRIN 81 MG CHEWABLE TABLET 81 MG: 81 TABLET CHEWABLE at 09:32

## 2022-05-29 RX ADMIN — Medication 10 ML: at 09:33

## 2022-05-29 RX ADMIN — HYDROCODONE BITARTRATE AND ACETAMINOPHEN 1 TABLET: 7.5; 325 TABLET ORAL at 16:57

## 2022-05-29 RX ADMIN — GABAPENTIN 600 MG: 300 CAPSULE ORAL at 21:09

## 2022-05-29 RX ADMIN — ENOXAPARIN SODIUM 40 MG: 40 INJECTION SUBCUTANEOUS at 09:35

## 2022-05-29 RX ADMIN — LEVOTHYROXINE SODIUM 150 MCG: 150 TABLET ORAL at 09:33

## 2022-05-30 PROCEDURE — 96372 THER/PROPH/DIAG INJ SC/IM: CPT

## 2022-05-30 PROCEDURE — G0378 HOSPITAL OBSERVATION PER HR: HCPCS

## 2022-05-30 PROCEDURE — 97110 THERAPEUTIC EXERCISES: CPT

## 2022-05-30 PROCEDURE — 25010000002 ENOXAPARIN PER 10 MG: Performed by: FAMILY MEDICINE

## 2022-05-30 PROCEDURE — 97530 THERAPEUTIC ACTIVITIES: CPT

## 2022-05-30 RX ADMIN — ENOXAPARIN SODIUM 40 MG: 40 INJECTION SUBCUTANEOUS at 08:27

## 2022-05-30 RX ADMIN — CLONAZEPAM 0.5 MG: 0.5 TABLET ORAL at 20:15

## 2022-05-30 RX ADMIN — OXYBUTYNIN CHLORIDE 15 MG: 5 TABLET, EXTENDED RELEASE ORAL at 20:14

## 2022-05-30 RX ADMIN — CEPHALEXIN 500 MG: 500 CAPSULE ORAL at 20:17

## 2022-05-30 RX ADMIN — HYDROCODONE BITARTRATE AND ACETAMINOPHEN 1 TABLET: 7.5; 325 TABLET ORAL at 08:26

## 2022-05-30 RX ADMIN — Medication 10 ML: at 20:18

## 2022-05-30 RX ADMIN — CEPHALEXIN 500 MG: 500 CAPSULE ORAL at 16:40

## 2022-05-30 RX ADMIN — SENNOSIDES AND DOCUSATE SODIUM 2 TABLET: 50; 8.6 TABLET ORAL at 20:16

## 2022-05-30 RX ADMIN — CLONAZEPAM 0.5 MG: 0.5 TABLET ORAL at 08:26

## 2022-05-30 RX ADMIN — ATENOLOL 50 MG: 50 TABLET ORAL at 08:26

## 2022-05-30 RX ADMIN — HYDROCODONE BITARTRATE AND ACETAMINOPHEN 1 TABLET: 7.5; 325 TABLET ORAL at 20:16

## 2022-05-30 RX ADMIN — Medication 10 ML: at 08:29

## 2022-05-30 RX ADMIN — HYDROCODONE BITARTRATE AND ACETAMINOPHEN 1 TABLET: 7.5; 325 TABLET ORAL at 01:13

## 2022-05-30 RX ADMIN — SENNOSIDES AND DOCUSATE SODIUM 2 TABLET: 50; 8.6 TABLET ORAL at 08:27

## 2022-05-30 RX ADMIN — ASPIRIN 81 MG CHEWABLE TABLET 81 MG: 81 TABLET CHEWABLE at 08:26

## 2022-05-30 RX ADMIN — CEPHALEXIN 500 MG: 500 CAPSULE ORAL at 09:39

## 2022-05-30 RX ADMIN — METFORMIN HYDROCHLORIDE 750 MG: 750 TABLET ORAL at 08:25

## 2022-05-30 RX ADMIN — GABAPENTIN 600 MG: 300 CAPSULE ORAL at 20:16

## 2022-05-30 RX ADMIN — LEVOTHYROXINE SODIUM 150 MCG: 150 TABLET ORAL at 08:26

## 2022-05-30 RX ADMIN — CYCLOBENZAPRINE 10 MG: 10 TABLET, FILM COATED ORAL at 01:13

## 2022-05-31 LAB — QT INTERVAL: 421 MS

## 2022-05-31 PROCEDURE — G0378 HOSPITAL OBSERVATION PER HR: HCPCS

## 2022-05-31 PROCEDURE — 97530 THERAPEUTIC ACTIVITIES: CPT

## 2022-05-31 PROCEDURE — 25010000002 ENOXAPARIN PER 10 MG: Performed by: FAMILY MEDICINE

## 2022-05-31 PROCEDURE — 96372 THER/PROPH/DIAG INJ SC/IM: CPT

## 2022-05-31 PROCEDURE — 97110 THERAPEUTIC EXERCISES: CPT

## 2022-05-31 RX ADMIN — CYCLOBENZAPRINE 10 MG: 10 TABLET, FILM COATED ORAL at 16:13

## 2022-05-31 RX ADMIN — CEPHALEXIN 500 MG: 500 CAPSULE ORAL at 08:33

## 2022-05-31 RX ADMIN — ASPIRIN 81 MG CHEWABLE TABLET 81 MG: 81 TABLET CHEWABLE at 08:33

## 2022-05-31 RX ADMIN — SENNOSIDES AND DOCUSATE SODIUM 2 TABLET: 50; 8.6 TABLET ORAL at 20:43

## 2022-05-31 RX ADMIN — CLONAZEPAM 0.5 MG: 0.5 TABLET ORAL at 08:34

## 2022-05-31 RX ADMIN — Medication 10 ML: at 08:34

## 2022-05-31 RX ADMIN — CEPHALEXIN 500 MG: 500 CAPSULE ORAL at 20:46

## 2022-05-31 RX ADMIN — METFORMIN HYDROCHLORIDE 750 MG: 750 TABLET ORAL at 08:34

## 2022-05-31 RX ADMIN — Medication 10 ML: at 20:46

## 2022-05-31 RX ADMIN — ATENOLOL 50 MG: 50 TABLET ORAL at 08:34

## 2022-05-31 RX ADMIN — OXYBUTYNIN CHLORIDE 15 MG: 5 TABLET, EXTENDED RELEASE ORAL at 20:45

## 2022-05-31 RX ADMIN — LEVOTHYROXINE SODIUM 150 MCG: 150 TABLET ORAL at 08:34

## 2022-05-31 RX ADMIN — CEPHALEXIN 500 MG: 500 CAPSULE ORAL at 16:32

## 2022-05-31 RX ADMIN — ENOXAPARIN SODIUM 40 MG: 40 INJECTION SUBCUTANEOUS at 08:33

## 2022-05-31 RX ADMIN — GABAPENTIN 600 MG: 300 CAPSULE ORAL at 20:45

## 2022-05-31 RX ADMIN — HYDROCODONE BITARTRATE AND ACETAMINOPHEN 1 TABLET: 7.5; 325 TABLET ORAL at 10:21

## 2022-05-31 RX ADMIN — CLONAZEPAM 0.5 MG: 0.5 TABLET ORAL at 20:43

## 2022-05-31 RX ADMIN — HYDROCODONE BITARTRATE AND ACETAMINOPHEN 1 TABLET: 7.5; 325 TABLET ORAL at 20:45

## 2022-05-31 RX ADMIN — CYCLOBENZAPRINE 10 MG: 10 TABLET, FILM COATED ORAL at 20:43

## 2022-06-01 PROCEDURE — G0378 HOSPITAL OBSERVATION PER HR: HCPCS

## 2022-06-01 PROCEDURE — 96372 THER/PROPH/DIAG INJ SC/IM: CPT

## 2022-06-01 PROCEDURE — 97116 GAIT TRAINING THERAPY: CPT

## 2022-06-01 PROCEDURE — 97530 THERAPEUTIC ACTIVITIES: CPT

## 2022-06-01 PROCEDURE — 25010000002 ENOXAPARIN PER 10 MG: Performed by: FAMILY MEDICINE

## 2022-06-01 RX ADMIN — Medication 10 ML: at 20:55

## 2022-06-01 RX ADMIN — CEPHALEXIN 500 MG: 500 CAPSULE ORAL at 20:55

## 2022-06-01 RX ADMIN — CEPHALEXIN 500 MG: 500 CAPSULE ORAL at 16:03

## 2022-06-01 RX ADMIN — METFORMIN HYDROCHLORIDE 750 MG: 750 TABLET ORAL at 08:51

## 2022-06-01 RX ADMIN — SENNOSIDES AND DOCUSATE SODIUM 2 TABLET: 50; 8.6 TABLET ORAL at 08:51

## 2022-06-01 RX ADMIN — CYCLOBENZAPRINE 10 MG: 10 TABLET, FILM COATED ORAL at 20:54

## 2022-06-01 RX ADMIN — GABAPENTIN 600 MG: 300 CAPSULE ORAL at 20:51

## 2022-06-01 RX ADMIN — OXYBUTYNIN CHLORIDE 15 MG: 5 TABLET, EXTENDED RELEASE ORAL at 20:51

## 2022-06-01 RX ADMIN — ATENOLOL 50 MG: 50 TABLET ORAL at 08:51

## 2022-06-01 RX ADMIN — CYCLOBENZAPRINE 10 MG: 10 TABLET, FILM COATED ORAL at 12:23

## 2022-06-01 RX ADMIN — LEVOTHYROXINE SODIUM 150 MCG: 150 TABLET ORAL at 08:51

## 2022-06-01 RX ADMIN — CLONAZEPAM 0.5 MG: 0.5 TABLET ORAL at 20:52

## 2022-06-01 RX ADMIN — Medication 10 ML: at 08:51

## 2022-06-01 RX ADMIN — CLONAZEPAM 0.5 MG: 0.5 TABLET ORAL at 08:51

## 2022-06-01 RX ADMIN — CEPHALEXIN 500 MG: 500 CAPSULE ORAL at 08:51

## 2022-06-01 RX ADMIN — HYDROCODONE BITARTRATE AND ACETAMINOPHEN 1 TABLET: 7.5; 325 TABLET ORAL at 16:08

## 2022-06-01 RX ADMIN — ASPIRIN 81 MG CHEWABLE TABLET 81 MG: 81 TABLET CHEWABLE at 08:51

## 2022-06-01 RX ADMIN — ENOXAPARIN SODIUM 40 MG: 40 INJECTION SUBCUTANEOUS at 08:50

## 2022-06-01 RX ADMIN — SENNOSIDES AND DOCUSATE SODIUM 2 TABLET: 50; 8.6 TABLET ORAL at 20:51

## 2022-06-01 NOTE — PLAN OF CARE
"Goal Outcome Evaluation:  Plan of Care Reviewed With: patient        Progress: improving  Outcome Evaluation: Pt is an anticipated discharge to the [facility] tomorrow for STR.  Pt's back pain/spasms and neck pain controlled with PRN Flexeril and PRN Norco 7.5 mg.  Pt has been working with PT/OT for ambulation and does require an assist x2-3 with a walker/gait belt.  Pt still c/o sensation that her knees \"are going to give out.\"  Pt is very nervous about ambulating with staff other than therapy.  Pt continues on RA.  Pt was up to chair late morning and through lunch.  Pt has worn a purewick while in bed as she in incontinent.  Good UOP noted.  LAC IV is SL.  Pt continues on PO Keflex for UTI.  Pt is pleasant.  Call light within reach.  Bed in lowest position.  "

## 2022-06-01 NOTE — PLAN OF CARE
Goal Outcome Evaluation:  Plan of Care Reviewed With: patient           Outcome Evaluation: PT: Patient performs supine to sit transfer with supervision, sit to/from stand transfers with min A x 2, and gait x 22 feet with one seated break with CGA with use of FWW. Patient demonstrates very slow gait speed with L knee in flexed position however no loss of balance or knee buckling noted. Patient overall progressing with therapy. Continues to fatigue quickly but is able to tolerate increased activity during each subsequent session. Continue to recommend STR at discharge.

## 2022-06-01 NOTE — CASE MANAGEMENT/SOCIAL WORK
Continued Stay Note  JOE Antoine     Patient Name: Sherri Sams  MRN: [id number]  Today's Date: 6/1/2022    Admit Date: 5/26/2022     Discharge Plan     Row Name 06/01/22 1710       Plan    Plan Comments I spoke with Deysi from [facility] today and she advised that she can accept the patient tomorrow.  Dr. Trejo advised. CM will follow.               Discharge Codes    No documentation.               Expected Discharge Date and Time     Expected Discharge Date Expected Discharge Time    May 31, 2022             Lina Lyle RN

## 2022-06-01 NOTE — PLAN OF CARE
Goal Outcome Evaluation:  Plan of Care Reviewed With: patient           Outcome Evaluation: OT: pt supervison for supine to sit transfer to EOB with extended time. pt required min assist x 2 for functional trasnfers from EOB and recliner chair with RW. pt performed functional mobility x 4 feet with CGA and RW. After seated rest break pt performed functional mobility with CGA and RW x 18 feet.  pt performed static standing balance with CGA and RW. pt with continued complaints of weakness in B LEs however mobility, transfers and balance improving. cont to rec SNF at discharge from facility

## 2022-06-01 NOTE — THERAPY TREATMENT NOTE
Acute Care - Occupational Therapy Treatment Note  JOE Antoine     Patient Name: Sherri Sams  : 1955  MRN: [id number]  Today's Date: 2022  Onset of Illness/Injury or Date of Surgery: 22  Date of Referral to OT: 22       Admit Date: 2022       ICD-10-CM ICD-9-CM   1. Urinary tract infection without hematuria, site unspecified  N39.0 599.0   2. Leg weakness, bilateral  R29.898 729.89   3. Multiple falls  R29.6 V15.88     Patient Active Problem List   Diagnosis   • UTI (urinary tract infection)     Past Medical History:   Diagnosis Date   • DDD (degenerative disc disease), cervical    • Diabetes mellitus (HCC)    • High cholesterol    • Hypertension    • Hypothyroid      History reviewed. No pertinent surgical history.      OT ASSESSMENT FLOWSHEET (last 12 hours)     OT Evaluation and Treatment     Row Name 22 0936                   OT Time and Intention    Subjective Information complains of;weakness  -JJ        Document Type therapy note (daily note)  -JJ        Mode of Treatment occupational therapy  -JJ        Patient Effort good  -JJ                  General Information    General Observations of Patient pt supine in bed, agreed to treatment  -JJ        Existing Precautions/Restrictions fall  -JJ        Limitations/Impairments sensory  chronic neuropathy B hands and feet  -JJ                  Pain Assessment    Pre/Posttreatment Pain Comment co pain in L LE briefly with bed mobility, improved with repositioning, did not rate  -JJ        Pain Intervention(s) Repositioned;Ambulation/increased activity  -JJ                  Cognition    Personal Safety Interventions gait belt;nonskid shoes/slippers when out of bed  -JJ                  Bathing Assessment/Intervention    Comment, (Bathing) encouraged to actively participate with adls  -JJ                  Bed Mobility    Bed Mobility supine-sit  -JJ        Supine-Sit [place] (Bed Mobility) supervision  -JJ        Assistive  Device (Bed Mobility) bed rails;head of bed elevated  -        Comment, (Bed Mobility) pt required extended time to complete supine to sit transfer to EOB.  -                  Functional Mobility    Functional Mobility- Ind. Level contact guard assist;2 person assist required;verbal cues required  -        Functional Mobility- Device walker, front-wheeled  -        Functional Mobility-Distance (Feet) 18  -JJ        Functional Mobility- Comment pt performed functional mobility x 4 feet with RW and CGA. After seated rest break pt performed functional mobility x 18 feet with CGA and RW.  -                  Transfer Assessment/Treatment    Transfers sit-stand transfer;stand-sit transfer  -        Comment, (Transfers) pt required verbal cues for hand placement  -                  Transfers    Sit-Stand [place] (Transfers) minimum assist (75% patient effort);2 person assist;verbal cues  -J        Stand-Sit [place] (Transfers) minimum assist (75% patient effort);2 person assist;verbal cues  -                  Sit-Stand Transfer    Assistive Device (Sit-Stand Transfers) walker, front-wheeled  -                  Stand-Sit Transfer    Assistive Device (Stand-Sit Transfers) walker, front-wheeled  -                  Balance    Comment, Balance static standing balance CGA with RW  -                  Plan of Care Review    Plan of Care Reviewed With patient  -        Outcome Evaluation OT: pt supervison for supine to sit transfer to EOB with extended time. pt required min assist x 2 for functional trasnfers from EOB and recliner chair with RW. pt performed functional mobility x 4 feet with CGA and RW. After seated rest break pt performed functional mobility with CGA and RW x 18 feet.  pt performed static standing balance with CGA and RW. pt with continued complaints of weakness in B LEs however mobility, transfers and balance improving. cont to rec SNF at discharge from facility  -JJ                   Positioning and Restraints    Pre-Treatment Position in bed  -JJ        Post Treatment Position chair  -JJ        In Chair sitting;call light within reach;encouraged to call for assist  -JJ                  Progress Summary (OT)    Progress Toward Functional Goals (OT) progress toward functional goals as expected  -JJ        Daily Progress Summary (OT) cont poc  -JJ              User Key  (r) = Recorded By, (t) = Taken By, (c) = Cosigned By    Initials Name Effective Dates    Christy Mccord, OTR 06/16/21 -                  Occupational Therapy Education                 Title: PT OT SLP Therapies (Done)     Topic: Occupational Therapy (Done)     Point: ADL training (Done)     Description:   Instruct learner(s) on proper safety adaptation and remediation techniques during self care or transfers.   Instruct in proper use of assistive devices.              Learning Progress Summary           Patient Acceptance, E,TB, VU by SUE at 6/1/2022 1104    Comment: pt educated on adls and safety with functional transfers and mobility    Acceptance, E,TB, VU by SUE at 5/31/2022 1349    Comment: pt educated on benefits of activity, adls and safety with functional transfers and mobility    Acceptance, E,TB, VU by SUE at 5/30/2022 1108    Comment: pt educated on benefits of activity and safety with functional transfers and balance    Acceptance, E, VU,DU by KM at 5/28/2022 0939    Comment: Continue to progress transfers and mobility    Acceptance, E,TB, VU by JACACIA at 5/27/2022 0939    Comment: pt educated on benefits of activity, adls and safety with functional transfers    Acceptance, E,TB, VU by SUE at 5/26/2022 1133    Comment: pt educated on adls, safety with functional transfers and mobility                   Point: Home exercise program (Done)     Description:   Instruct learner(s) on appropriate technique for monitoring, assisting and/or progressing therapeutic exercises/activities.              Learning Progress  Summary           Patient Acceptance, ANEL CANSECODIANA by KM at 5/28/2022 0939    Comment: Continue to progress transfers and mobility                               User Key     Initials Effective Dates Name Provider Type Discipline    SUE 06/16/21 -  Christy Briggs OTR Occupational Therapist OT    CHRISTIANO 06/16/21 -  Ronda Baker PTA Physical Therapist Assistant PT                  OT Recommendation and Plan  Planned Therapy Interventions (OT): adaptive equipment training, BADL retraining, functional balance retraining, transfer/mobility retraining, patient/caregiver education/training, ROM/therapeutic exercise  Therapy Frequency (OT): 5 times/wk  Progress Toward Functional Goals (OT): progress toward functional goals as expected  Plan of Care Review  Plan of Care Reviewed With: patient  Outcome Evaluation: OT: pt supervison for supine to sit transfer to EOB with extended time. pt required min assist x 2 for functional trasnfers from EOB and recliner chair with RW. pt performed functional mobility x 4 feet with CGA and RW. After seated rest break pt performed functional mobility with CGA and RW x 18 feet.  pt performed static standing balance with CGA and RW. pt with continued complaints of weakness in B LEs however mobility, transfers and balance improving. cont to rec SNF at discharge from facility  Plan of Care Reviewed With: patient  Outcome Evaluation: OT: pt supervison for supine to sit transfer to EOB with extended time. pt required min assist x 2 for functional trasnfers from EOB and recliner chair with RW. pt performed functional mobility x 4 feet with CGA and RW. After seated rest break pt performed functional mobility with CGA and RW x 18 feet.  pt performed static standing balance with CGA and RW. pt with continued complaints of weakness in B LEs however mobility, transfers and balance improving. cont to rec SNF at discharge from facility     Outcome Measures     Row Name 06/01/22 0936 05/31/22 0839  05/31/22 0838       How much help from another person do you currently need...    Turning from your back to your side while in flat bed without using bedrails? -- -- 3  -BP    Moving from lying on back to sitting on the side of a flat bed without bedrails? -- -- 3  -BP    Moving to and from a bed to a chair (including a wheelchair)? -- -- 3  -BP    Standing up from a chair using your arms (e.g., wheelchair, bedside chair)? -- -- 2  -BP    Climbing 3-5 steps with a railing? -- -- 1  -BP    To walk in hospital room? -- -- 3  -BP    AM-PAC 6 Clicks Score (PT) -- -- 15  -BP       How much help from another is currently needed...    Putting on and taking off regular lower body clothing? 2  -JJ 2  -JJ --    Bathing (including washing, rinsing, and drying) 2  -JJ 2  -JJ --    Toileting (which includes using toilet bed pan or urinal) 3  -JJ 2  -JJ --    Putting on and taking off regular upper body clothing 4  -JJ 4  -JJ --    Taking care of personal grooming (such as brushing teeth) 4  -JJ 4  -JJ --    Eating meals 4  -JJ 4  -JJ --    AM-PAC 6 Clicks Score (OT) 19  -JJ 18  -JJ --       Functional Assessment    Outcome Measure Options -- -- AM-PAC 6 Clicks Basic Mobility (PT)  -BP    Row Name 05/30/22 0837 05/30/22 0836          How much help from another person do you currently need...    Turning from your back to your side while in flat bed without using bedrails? -- 3  -BP     Moving from lying on back to sitting on the side of a flat bed without bedrails? -- 3  -BP     Moving to and from a bed to a chair (including a wheelchair)? -- 3  -BP     Standing up from a chair using your arms (e.g., wheelchair, bedside chair)? -- 3  -BP     Climbing 3-5 steps with a railing? -- 1  -BP     To walk in hospital room? -- 1  -BP     AM-PAC 6 Clicks Score (PT) -- 14  -BP            How much help from another is currently needed...    Putting on and taking off regular lower body clothing? 2  -JJ --     Bathing (including washing,  rinsing, and drying) 2  -JJ --     Toileting (which includes using toilet bed pan or urinal) 2  -JJ --     Putting on and taking off regular upper body clothing 4  -JJ --     Taking care of personal grooming (such as brushing teeth) 4  -JJ --     Eating meals 4  -JJ --     AM-PAC 6 Clicks Score (OT) 18  -JJ --            Functional Assessment    Outcome Measure Options -- AM-PAC 6 Clicks Basic Mobility (PT)  -BP           User Key  (r) = Recorded By, (t) = Taken By, (c) = Cosigned By    Initials Name Provider Type    Christy Mccord, OTR Occupational Therapist    BP Rupesh Argueta, PT Physical Therapist                Time Calculation:    Time Calculation- OT     Row Name 06/01/22 1106             Time Calculation- OT    OT Start Time 0935  -      OT Stop Time 1000  -      OT Time Calculation (min) 25 min  -            User Key  (r) = Recorded By, (t) = Taken By, (c) = Cosigned By    Initials Name Provider Type    Christy Mccord OTR Occupational Therapist              Therapy Charges for Today     Code Description Service Date Service Provider Modifiers Qty    95953829669 HC OT THERAPEUTIC ACT EA 15 MIN 5/31/2022 Christy Briggs OTR GO 1    61080659175 HC OT THERAPEUTIC ACT EA 15 MIN 6/1/2022 Christy Briggs OTR GO 2               RACHNA Olivo  6/1/2022

## 2022-06-01 NOTE — PLAN OF CARE
"Goal Outcome Evaluation:           Progress: improving  Outcome Evaluation: vss overnight. pt is tearful about going to rehab, states she is worried they will not take care of her and \"drop\" her. needs to speak with case management about which facility she will be going to. norco and flexiril given once @ beginning of shift, no other c/o pain. did not ambulate this shift.  "

## 2022-06-01 NOTE — THERAPY TREATMENT NOTE
Acute Care - Physical Therapy Treatment Note  JOE Antoine     Patient Name: Sherri Sams  : 1955  MRN: [id number]  Today's Date: 2022   Onset of Illness/Injury or Date of Surgery: 22  Visit Dx:     ICD-10-CM ICD-9-CM   1. Urinary tract infection without hematuria, site unspecified  N39.0 599.0   2. Leg weakness, bilateral  R29.898 729.89   3. Multiple falls  R29.6 V15.88     Patient Active Problem List   Diagnosis   • UTI (urinary tract infection)     Past Medical History:   Diagnosis Date   • DDD (degenerative disc disease), cervical    • Diabetes mellitus (HCC)    • High cholesterol    • Hypertension    • Hypothyroid      History reviewed. No pertinent surgical history.  PT Assessment (last 12 hours)     PT Evaluation and Treatment     Row Name 22 0935          Physical Therapy Time and Intention    Subjective Information complains of;weakness  -BP     Document Type therapy note (daily note)  -BP     Mode of Treatment physical therapy  -BP     Patient Effort good  -BP     Symptoms Noted During/After Treatment none  -BP     Row Name 22 0926          General Information    Patient/Family/Caregiver Comments/Observations Patient supine in bed with HOB elevated. Patient agreeable to PT treatment.  -BP     Existing Precautions/Restrictions fall  -BP     Limitations/Impairments sensory  Chronic neuropathy B hands/feet  -BP     Risks Reviewed patient:;LOB;increased discomfort  -BP     Benefits Reviewed patient:;improve function;increase independence;increase strength  -BP     Barriers to Rehab previous functional deficit  -BP     Row Name 22 0935          Pain    Pre/Posttreatment Pain Comment c/o pain in L knee briefly with supine to sit transfer. Does not complain with transfers and gait.  -BP     Pain Intervention(s) Repositioned;Ambulation/increased activity  -BP     Row Name 22 0957          Cognition    Personal Safety Interventions gait belt;nonskid shoes/slippers when out  of bed  -BP     Row Name 06/01/22 0935          Bed Mobility    Bed Mobility supine-sit  -BP     Supine-Sit Vance (Bed Mobility) supervision  -BP     Assistive Device (Bed Mobility) bed rails;head of bed elevated  -BP     Comment, (Bed Mobility) Patient requires extended time to complete transfer however able to complete without assist  -BP     Row Name 06/01/22 0935          Transfers    Transfers sit-stand transfer;stand-sit transfer  -BP     Comment, (Transfers) Verbal cues required for hand placement.  -BP     Sit-Stand Vance (Transfers) minimum assist (75% patient effort);2 person assist;verbal cues  -BP     Stand-Sit Vance (Transfers) minimum assist (75% patient effort);2 person assist;verbal cues  -BP     Row Name 06/01/22 0935          Sit-Stand Transfer    Assistive Device (Sit-Stand Transfers) walker, front-wheeled  -BP     Row Name 06/01/22 0935          Stand-Sit Transfer    Assistive Device (Stand-Sit Transfers) walker, front-wheeled  -BP     Row Name 06/01/22 0935          Gait/Stairs (Locomotion)    Vance Level (Gait) contact guard;verbal cues  -BP     Assistive Device (Gait) walker, front-wheeled  -BP     Distance in Feet (Gait) 4, 18  seated break in between  -BP     Pattern (Gait) swing-to  -BP     Deviations/Abnormal Patterns (Gait) gait speed decreased;stride length decreased  -BP     Bilateral Gait Deviations forward flexed posture  -BP     Comment, (Gait/Stairs) Patient keeps L knee flexed throughout gait however no knee buckling. Patient with significantly decreased gait speed however no loss of balance noted.  -BP     Row Name 06/01/22 0935          Balance    Comment, Balance static standing-CGA with device  -BP     Row Name 06/01/22 0935          Plan of Care Review    Plan of Care Reviewed With patient  -BP     Outcome Evaluation PT: Patient performs supine to sit transfer with supervision, sit to/from stand transfers with min A x 2, and gait x 22 feet with one  seated break with CGA with use of FWW. Patient demonstrates very slow gait speed with L knee in flexed position however no loss of balance or knee buckling noted. Patient overall progressing with therapy. Continues to fatigue quickly but is able to tolerate increased activity during each subsequent session. Continue to recommend STR at discharge.  -BP     Row Name 06/01/22 0935          Positioning and Restraints    Pre-Treatment Position in bed  -BP     Post Treatment Position chair  -BP     In Chair reclined;call light within reach;encouraged to call for assist  -BP     Row Name 06/01/22 0935          Progress Summary (PT)    Progress Toward Functional Goals (PT) progress toward functional goals is good  -BP     Row Name 06/01/22 0935          Therapy Plan Review/Discharge Plan (PT)    Therapy Plan Review (PT) patient;risks/benefits reviewed;current/potential barriers reviewed;participants included  -BP           User Key  (r) = Recorded By, (t) = Taken By, (c) = Cosigned By    Initials Name Provider Type    Rupesh Palma, PT Physical Therapist                Physical Therapy Education                 Title: PT OT SLP Therapies (Done)     Topic: Physical Therapy (Done)     Point: Mobility training (Done)     Learning Progress Summary           Patient Acceptance, E,TB, VU by BP at 6/1/2022 1235    Acceptance, E,TB, VU by BP at 5/31/2022 1045    Acceptance, E,TB, VU by BP at 5/30/2022 1029    Acceptance, E,TB, VU by GC at 5/29/2022 1030    Acceptance, E, VU,DU by KM at 5/28/2022 0939    Comment: Continue to progress transfers and mobility    Acceptance, E,TB, VU by BP at 5/27/2022 1305    Acceptance, E,TB, VU by JW at 5/26/2022 1253                   Point: Home exercise program (Done)     Learning Progress Summary           Patient Acceptance, E,TB, VU by BP at 5/30/2022 1029    Acceptance, E, VU,DU by KM at 5/28/2022 0939    Comment: Continue to progress transfers and mobility                                User Key     Initials Effective Dates Name Provider Type Discipline     06/16/21 -  Surinder Luna, PT Physical Therapist PT    BP 06/16/21 -  Soni Argueta-August, PT Physical Therapist PT    JW 06/16/21 -  Katherine Rivera, PT Physical Therapist PT    KM 06/16/21 -  Ronda Baker PTA Physical Therapist Assistant PT              PT Recommendation and Plan  Anticipated Discharge Disposition (PT): skilled nursing facility  Progress Summary (PT)  Progress Toward Functional Goals (PT): progress toward functional goals is good  Plan of Care Reviewed With: patient  Outcome Evaluation: PT: Patient performs supine to sit transfer with supervision, sit to/from stand transfers with min A x 2, and gait x 22 feet with one seated break with CGA with use of FWW. Patient demonstrates very slow gait speed with L knee in flexed position however no loss of balance or knee buckling noted. Patient overall progressing with therapy. Continues to fatigue quickly but is able to tolerate increased activity during each subsequent session. Continue to recommend STR at discharge.   Outcome Measures     Row Name 06/01/22 0936 06/01/22 0935 05/31/22 0839       How much help from another person do you currently need...    Turning from your back to your side while in flat bed without using bedrails? -- 3  -BP --    Moving from lying on back to sitting on the side of a flat bed without bedrails? -- 3  -BP --    Moving to and from a bed to a chair (including a wheelchair)? -- 3  -BP --    Standing up from a chair using your arms (e.g., wheelchair, bedside chair)? -- 3  -BP --    Climbing 3-5 steps with a railing? -- 2  -BP --    To walk in hospital room? -- 3  -BP --    AM-PAC 6 Clicks Score (PT) -- 17  -BP --       How much help from another is currently needed...    Putting on and taking off regular lower body clothing? 2  -JJ -- 2  -JJ    Bathing (including washing, rinsing, and drying) 2  -JJ -- 2  -JJ    Toileting (which includes using toilet  bed pan or urinal) 3  -JJ -- 2  -JJ    Putting on and taking off regular upper body clothing 4  -JJ -- 4  -JJ    Taking care of personal grooming (such as brushing teeth) 4  -JJ -- 4  -JJ    Eating meals 4  -JJ -- 4  -JJ    AM-PAC 6 Clicks Score (OT) 19  -JJ -- 18  -JJ       Functional Assessment    Outcome Measure Options -- AM-PAC 6 Clicks Basic Mobility (PT)  -BP --    Row Name 05/31/22 0838 05/30/22 0837 05/30/22 0836       How much help from another person do you currently need...    Turning from your back to your side while in flat bed without using bedrails? 3  -BP -- 3  -BP    Moving from lying on back to sitting on the side of a flat bed without bedrails? 3  -BP -- 3  -BP    Moving to and from a bed to a chair (including a wheelchair)? 3  -BP -- 3  -BP    Standing up from a chair using your arms (e.g., wheelchair, bedside chair)? 2  -BP -- 3  -BP    Climbing 3-5 steps with a railing? 1  -BP -- 1  -BP    To walk in hospital room? 3  -BP -- 1  -BP    AM-PAC 6 Clicks Score (PT) 15  -BP -- 14  -BP       How much help from another is currently needed...    Putting on and taking off regular lower body clothing? -- 2  -JJ --    Bathing (including washing, rinsing, and drying) -- 2  -JJ --    Toileting (which includes using toilet bed pan or urinal) -- 2  -JJ --    Putting on and taking off regular upper body clothing -- 4  -JJ --    Taking care of personal grooming (such as brushing teeth) -- 4  -JJ --    Eating meals -- 4  -JJ --    AM-PAC 6 Clicks Score (OT) -- 18  -JJ --       Functional Assessment    Outcome Measure Options AM-PAC 6 Clicks Basic Mobility (PT)  -BP -- AM-PAC 6 Clicks Basic Mobility (PT)  -BP          User Key  (r) = Recorded By, (t) = Taken By, (c) = Cosigned By    Initials Name Provider Type    Christy Mccord, OTR Occupational Therapist    BP Rupesh Argueta, PT Physical Therapist                 Time Calculation:    PT Charges     Row Name 06/01/22 1237             Time  Calculation    Start Time 0935  -BP      Stop Time 1000  -BP      Time Calculation (min) 25 min  -BP      PT Received On 06/01/22  -BP      PT - Next Appointment 06/02/22  -BP              Timed Charges    71348 - Gait Training Minutes  25  -BP              Total Minutes    Timed Charges Total Minutes 25  -BP       Total Minutes 25  -BP            User Key  (r) = Recorded By, (t) = Taken By, (c) = Cosigned By    Initials Name Provider Type    BP Rupesh Argueta, PT Physical Therapist              Therapy Charges for Today     Code Description Service Date Service Provider Modifiers Qty    06923652352 HC PT THER PROC EA 15 MIN 5/31/2022 Rupesh Argueta, PT GP 1    14071574938 HC GAIT TRAINING EA 15 MIN 6/1/2022 Rupesh Argueta, PT GP 2          PT G-Codes  Outcome Measure Options: AM-PAC 6 Clicks Basic Mobility (PT)  AM-PAC 6 Clicks Score (PT): 17  AM-PAC 6 Clicks Score (OT): 19    Rupesh Argueta PT  6/1/2022

## 2022-06-01 NOTE — PROGRESS NOTES
"Daily Progress Note:      Chief complaint: Lower extremity weakness, urinary tract pressure, diabetes, hypertension    Subjective: No overnight events noted.  Generalized weakness lower extremity weakness overall the same.  Making some progress with therapies     LOS: 0 days     Vital Signs  Temp:  [97.1 °F (36.2 °C)-98.1 °F (36.7 °C)] 97.1 °F (36.2 °C)  Heart Rate:  [] 72  Resp:  [16] 16  BP: (103-117)/(70-73) 108/71  Oxygen Therapy  SpO2: 96 %  Pulse Oximetry Type: Intermittent  Device (Oxygen Therapy): room air}  Body mass index is 39.21 kg/m².  Flowsheet Rows    Flowsheet Row First Filed Value   Admission Height 165.1 cm (65\") Documented at 05/26/2022 0338   Admission Weight 108 kg (239 lb) Documented at 05/26/2022 0338                   Documented weights    05/26/22 0338 05/26/22 0736 05/28/22 0634 05/29/22 0520   Weight: 108 kg (239 lb) 121 kg (266 lb 1.6 oz) 109 kg (240 lb) 109 kg (240 lb 1.6 oz)    05/30/22 0500 05/31/22 0456   Weight: 109 kg (239 lb 6.4 oz) 107 kg (235 lb 9.6 oz)           Patient Vitals for the past 24 hrs:   BP Temp Temp src Pulse Resp SpO2   06/01/22 0613 108/71 97.1 °F (36.2 °C) Oral 72 16 96 %   05/31/22 2333 110/73 98 °F (36.7 °C) Oral 77 16 94 %   05/31/22 1930 108/71 98.1 °F (36.7 °C) Oral 80 16 95 %   05/31/22 1523 117/73 98.1 °F (36.7 °C) Oral 81 16 99 %   05/31/22 1124 103/70 97.4 °F (36.3 °C) Oral 100 16 95 %       107 kg (235 lb 9.6 oz)    Intake/Output                       05/30/22 0701 - 05/31/22 0700 05/31/22 0701 - 06/01/22 0700     [id number] 9301-8436 Total 8354-6129 0786-2699 Total                 Intake    P.O.  1200  -- 1200  960  -- 960    Total Intake 1200 -- 1200 960 -- 960       Output    Urine  595  2450 3045  --  -- --    Total Output 595 2450 3045 -- -- --           Intake/Output Summary (Last 24 hours) at 6/1/2022 0753  Last data filed at 5/31/2022 1830  Gross per 24 hour   Intake 960 ml   Output --   Net 960 ml        Intake/Output Summary (Last 24 " hours) at 6/1/2022 0753  Last data filed at 5/31/2022 1830  Gross per 24 hour   Intake 960 ml   Output --   Net 960 ml        Review of Systems   Constitutional: Positive for activity change. Negative for appetite change and fatigue.   HENT: Negative for congestion.    Respiratory: Negative for cough, chest tightness, shortness of breath and wheezing.    Cardiovascular: Negative for chest pain.   Gastrointestinal: Negative for abdominal distention, abdominal pain, diarrhea, nausea and vomiting.   Endocrine: Negative for polyphagia and polyuria.   Genitourinary: Negative for frequency.   Musculoskeletal: Positive for back pain and myalgias.   Skin: Negative for rash.   Neurological: Positive for weakness and numbness. Negative for light-headedness.   Hematological: Does not bruise/bleed easily.   Psychiatric/Behavioral: Negative for agitation and behavioral problems.       Physical Exam  Vitals and nursing note reviewed.   Constitutional:       Appearance: She is well-developed. She is morbidly obese.   HENT:      Head: Normocephalic.   Eyes:      Conjunctiva/sclera: Conjunctivae normal.   Neck:      Thyroid: No thyromegaly.      Vascular: No JVD.   Cardiovascular:      Rate and Rhythm: Normal rate and regular rhythm.      Heart sounds: Normal heart sounds. No murmur heard.  Pulmonary:      Effort: Pulmonary effort is normal. No respiratory distress.      Breath sounds: Normal breath sounds. No wheezing or rales.   Abdominal:      General: Bowel sounds are normal. There is no distension.      Palpations: Abdomen is soft.      Tenderness: There is no abdominal tenderness. There is no guarding.   Skin:     General: Skin is warm and dry.      Findings: No rash.   Neurological:      General: No focal deficit present.      Mental Status: She is alert and oriented to person, place, and time.   Psychiatric:         Attention and Perception: Attention normal.         Mood and Affect: Mood normal.         Speech: Speech  normal.         Medication Review:   I have reviewed the patient's current medication list  Scheduled Meds:aspirin, 81 mg, Oral, Daily  atenolol, 50 mg, Oral, Daily  cephalexin, 500 mg, Oral, TID  clonazePAM, 0.5 mg, Oral, BID  enoxaparin, 40 mg, Subcutaneous, Q24H  gabapentin, 600 mg, Oral, Nightly  levothyroxine, 150 mcg, Oral, Daily  metFORMIN ER, 750 mg, Oral, Daily With Breakfast  oxybutynin XL, 15 mg, Oral, Nightly  senna-docusate sodium, 2 tablet, Oral, BID  sodium chloride, 10 mL, Intravenous, Q12H      Continuous Infusions:   PRN Meds:.•  acetaminophen **OR** acetaminophen **OR** acetaminophen  •  aluminum-magnesium hydroxide-simethicone  •  senna-docusate sodium **AND** polyethylene glycol **AND** bisacodyl **AND** bisacodyl  •  cyclobenzaprine  •  HYDROcodone-acetaminophen  •  melatonin  •  ondansetron **OR** ondansetron  •  sodium chloride  •  [COMPLETED] Insert peripheral IV **AND** sodium chloride  •  sodium chloride      Result Review    Result Review:  I have personally reviewed the results from the time of this admission to 6/1/2022 07:53 EDT and agree with these findings:  [x]  Laboratory  [x]  Microbiology  [x]  Radiology  []  EKG/Telemetry   []  Cardiology/Vascular   []  Pathology  []  Old records  []  Other:          Labs:  Results from last 7 days   Lab Units 05/29/22  0335 05/27/22 0337 05/26/22 0447   WBC 10*3/mm3 7.56 6.24 10.99*   HEMOGLOBIN g/dL 11.6* 11.0* 12.8   HEMATOCRIT % 36.9 35.1 39.3   PLATELETS 10*3/mm3 202 204 253     Results from last 7 days   Lab Units 05/29/22  0335 05/27/22 0337 05/26/22 0447   SODIUM mmol/L 138 141 137   POTASSIUM mmol/L 3.7 3.6 3.2*   CHLORIDE mmol/L 103 105 97*   CO2 mmol/L 20.2* 24.1 25.1   BUN mg/dL 21 28* 34*   CREATININE mg/dL 1.20* 1.25* 1.35*   CALCIUM mg/dL 9.6 9.0 10.1   BILIRUBIN mg/dL  --   --  0.5   ALK PHOS U/L  --   --  78   ALT (SGPT) U/L  --   --  16   AST (SGOT) U/L  --   --  21   GLUCOSE mg/dL 98 103* 116*           Results from last 7  days   Lab Units 05/29/22  0335 05/27/22  0337 05/26/22  0447   AST (SGOT) U/L  --   --  21   ALT (SGPT) U/L  --   --  16   PROCALCITONIN ng/mL  --   --  0.08   PLATELETS 10*3/mm3 202 204 253         Lab Results (last 24 hours)     ** No results found for the last 24 hours. **        Results from last 7 days   Lab Units 05/26/22 0447   CK TOTAL U/L 194*   TROPONIN T ng/mL <0.010     Results from last 7 days   Lab Units 05/26/22 0447   TSH uIU/mL 6.700*                             No results found for: POCGLU  Results from last 7 days   Lab Units 05/26/22 0447   PROCALCITONIN ng/mL 0.08     Results from last 7 days   Lab Units 05/26/22 0446   URINECX  >100,000 CFU/mL Escherichia coli*     Results from last 7 days   Lab Units 05/26/22 0446   NITRITE UA  Negative   WBC UA /HPF 21-30*   BACTERIA UA /HPF 4+*   SQUAM EPITHEL UA /HPF 3-6*   URINECX  >100,000 CFU/mL Escherichia coli*             Radiology:  Imaging Results (Last 24 Hours)     ** No results found for the last 24 hours. **          Cardiology:  ECG/EMG Results (last 24 hours)     ** No results found for the last 24 hours. **          I have reviewed recent labs results and consult notes.    Please note portions of this assessment/plan may have been copied and pasted, but I have personally seen this patient and reviewed each line of this assessment and plan for accuracy and made updates to reflect my necessary changes    Assessment and Plan:    1.  Frequent falls bilateral extremity weakness etiology be determined.  Thoracic MRI unremarkable except for multilevel degenerative disc disease and disc protrusions.  No obvious cord compression but less than optimal films.  Continues to require maximal assistance for ambulation.  Awaiting  placement for short-term rehab      2.  Urinary tract infection secondary to E. coli on p.o. Ceftin    3.  Chronic kidney disease stage III stable creatinine back at baseline    4.  Adult-onset diabetes mellitus  controlled        5.  Hypothyroidism stable continue with home levothyroxine     6.  Diabetic peripheral neuropathy stable continue with gabapentin     7.  Hypertension stable nothing acute continue with home medications     8.  DVT prophylaxis Lovenox    Much of this encounter note is an electronic transcription/translation of spoken language to printed text. The electronic translation of spoken language may permit erroneous, or at times, nonsensical words or phrases to be inadvertently transcribed; Although I have reviewed the note for such errors, some may still exist.    As of April 2021, as required by the Federal [employer] C[employer] Act, medical records (including provider notes and laboratory/imaging results) are to be made available to patients and/or their designees as soon as the documents are signed/resulted. While the intention is to ensure transparency and to engage patients in their healthcare, this immediate access may create unintended consequences because this document uses language intended for communication between medical providers for interpretation with the entirety of the patient's clinical picture in mind. It is recommended that patients and/or their designees review all available information with their primary or specialist providers for explanation and to avoid misinterpretation of this information

## 2022-06-02 VITALS
WEIGHT: 235.6 LBS | BODY MASS INDEX: 39.25 KG/M2 | OXYGEN SATURATION: 99 % | HEIGHT: 65 IN | SYSTOLIC BLOOD PRESSURE: 104 MMHG | HEART RATE: 73 BPM | RESPIRATION RATE: 18 BRPM | TEMPERATURE: 97 F | DIASTOLIC BLOOD PRESSURE: 67 MMHG

## 2022-06-02 PROBLEM — E78.5 HYPERLIPEMIA: Status: ACTIVE | Noted: 2022-06-02

## 2022-06-02 PROBLEM — I10 ESSENTIAL HYPERTENSION: Status: ACTIVE | Noted: 2022-06-02

## 2022-06-02 PROBLEM — G89.29 CHRONIC PAIN: Status: ACTIVE | Noted: 2022-06-02

## 2022-06-02 PROBLEM — M48.061 LUMBAR SPINAL STENOSIS: Status: ACTIVE | Noted: 2022-06-02

## 2022-06-02 PROBLEM — E11.40 DIABETES MELLITUS WITH NEUROPATHY (HCC): Status: ACTIVE | Noted: 2022-06-02

## 2022-06-02 PROBLEM — E66.01 MORBID OBESITY (HCC): Status: ACTIVE | Noted: 2022-06-02

## 2022-06-02 PROCEDURE — 96372 THER/PROPH/DIAG INJ SC/IM: CPT

## 2022-06-02 PROCEDURE — 25010000002 ENOXAPARIN PER 10 MG: Performed by: FAMILY MEDICINE

## 2022-06-02 PROCEDURE — G0378 HOSPITAL OBSERVATION PER HR: HCPCS

## 2022-06-02 RX ORDER — CLONAZEPAM 0.5 MG/1
0.5 TABLET ORAL 2 TIMES DAILY
Qty: 15 TABLET | Refills: 0 | Status: SHIPPED | OUTPATIENT
Start: 2022-06-02 | End: 2022-06-03

## 2022-06-02 RX ORDER — ACETAMINOPHEN 325 MG/1
650 TABLET ORAL EVERY 4 HOURS PRN
Start: 2022-06-02

## 2022-06-02 RX ORDER — BISACODYL 10 MG
10 SUPPOSITORY, RECTAL RECTAL DAILY PRN
Start: 2022-06-02

## 2022-06-02 RX ORDER — BISACODYL 5 MG/1
5 TABLET, DELAYED RELEASE ORAL DAILY PRN
Start: 2022-06-02

## 2022-06-02 RX ORDER — GABAPENTIN 300 MG/1
600 CAPSULE ORAL NIGHTLY
Qty: 15 CAPSULE | Refills: 0 | Status: SHIPPED | OUTPATIENT
Start: 2022-06-02

## 2022-06-02 RX ORDER — POLYETHYLENE GLYCOL 3350 17 G/17G
17 POWDER, FOR SOLUTION ORAL DAILY PRN
Start: 2022-06-02

## 2022-06-02 RX ORDER — HYDROCODONE BITARTRATE AND ACETAMINOPHEN 7.5; 325 MG/1; MG/1
1 TABLET ORAL EVERY 8 HOURS PRN
Qty: 21 TABLET | Refills: 0 | Status: SHIPPED | OUTPATIENT
Start: 2022-06-02

## 2022-06-02 RX ORDER — AMOXICILLIN 250 MG
2 CAPSULE ORAL 2 TIMES DAILY
Start: 2022-06-02

## 2022-06-02 RX ADMIN — HYDROCODONE BITARTRATE AND ACETAMINOPHEN 1 TABLET: 7.5; 325 TABLET ORAL at 09:54

## 2022-06-02 RX ADMIN — METFORMIN HYDROCHLORIDE 750 MG: 750 TABLET ORAL at 08:44

## 2022-06-02 RX ADMIN — LEVOTHYROXINE SODIUM 150 MCG: 150 TABLET ORAL at 08:43

## 2022-06-02 RX ADMIN — CEPHALEXIN 500 MG: 500 CAPSULE ORAL at 08:43

## 2022-06-02 RX ADMIN — SENNOSIDES AND DOCUSATE SODIUM 2 TABLET: 50; 8.6 TABLET ORAL at 08:43

## 2022-06-02 RX ADMIN — CLONAZEPAM 0.5 MG: 0.5 TABLET ORAL at 08:43

## 2022-06-02 RX ADMIN — HYDROCODONE BITARTRATE AND ACETAMINOPHEN 1 TABLET: 7.5; 325 TABLET ORAL at 01:38

## 2022-06-02 RX ADMIN — ATENOLOL 50 MG: 50 TABLET ORAL at 08:44

## 2022-06-02 RX ADMIN — ASPIRIN 81 MG CHEWABLE TABLET 81 MG: 81 TABLET CHEWABLE at 08:44

## 2022-06-02 RX ADMIN — ENOXAPARIN SODIUM 40 MG: 40 INJECTION SUBCUTANEOUS at 08:44

## 2022-06-02 RX ADMIN — CYCLOBENZAPRINE 10 MG: 10 TABLET, FILM COATED ORAL at 14:58

## 2022-06-02 RX ADMIN — Medication 10 ML: at 08:44

## 2022-06-02 NOTE — NURSING NOTE
"Attempted to call report to [facility].  Spoke with three different staff members.  Was told no one \"knew of this patient coming today.\"  Gave them my number and they stated they \"would call me back once they figure out what is going on.\"    "

## 2022-06-02 NOTE — CASE MANAGEMENT/SOCIAL WORK
Continued Stay Note  JOE Antoine     Patient Name: Sherri Sams  MRN: [id number]  Today's Date: 6/2/2022    Admit Date: 5/26/2022     Discharge Plan     Row Name 06/02/22 1034       Plan    Plan [facility]    Patient/Family in Agreement with Plan yes    Plan Comments Deysi with [facility] with return call and states the only time they have open to send the van is at 4:30pm. Spoke with primary nurse Royer and informed her of such and she will check with the patient to see if family can transport. Royer called back and states that there is no family who can transport and to schedule the van today for 4:30. CM called Deysi back and informed her of such and she states they will be here at 4:30 to  patient. CM will continue to follow for needs.    Row Name 06/02/22 0857       Plan    Plan [facility]    Plan Comments Patient noted for discharge. CM called and spoke with Deysi at [facility] and informed her that patient was being discharged today. She states that she was not aware patient was coming today and she is on her way into the facility to assign her a bed and that the nurse will call for report when they are ready. CM also asked if they could use the van to  the patient and she states she will find this out and let me know. CM updated primary nurse Royer of such. CM will continue to follow for needs.               Discharge Codes    No documentation.               Expected Discharge Date and Time     Expected Discharge Date Expected Discharge Time    Jun 2, 2022             Ny Cm RN

## 2022-06-02 NOTE — DISCHARGE SUMMARY
Sherri Sams  1955  [id number]        Discharge Summary    Date of Admission: 5/26/2022  Date of Discharge:  6/2/2022    Primary Discharge Diagnoses:     General /lower extremity weakness multifactorial likely due to urinary tract infection lumbar spinal stenosis peripheral neuropathy secondary diabetes  Urinary tract infection secondary E. coli    Secondary Discharge Diagnoses:     Hypertension  Hyperlipidemia  Type II diabetes mellitus controlled with peripheral neuropathy  Hypothyroidism  Chronic kidney disease stage III  Morbid obesity    PCP  Patient Care Team:  Tiffany Trejo MD as PCP - General (Family Medicine)    Consults:   Consults     Date and Time Order Name Status Description    5/26/2022  2:00 PM Inpatient Neurology Consult Completed             History of Present Illness:    66-year-old white female with history of hypertension diabetes mellitus peripheral neuropathy chronic low back pain who was brought to the emergency room because she has had 4 falls in the last 2 days.  Patient was hardly able to get up off the floor but somehow managed to get up.  Last night she fell was unable to get up off the floor and called 911.  Paramedics got there they did help her up as patient was unable to ambulate with a walker and therefore sent to the emergency room.     Patient with a history of diabetic neuropathy in both lower extremities as well as upper extremities chronic numbness and discomfort.  She Takes gabapentin for the same.  She states her numbness has been persistent but not gotten any worse but she been having increasing lower extremity upper extremity cramps for the last 6 weeks.  She is also gotten weaker in her legs for the last 2 to 3 weeks the pain has been the same.  She has known history of lumbar degenerative disease and lumbar spinal stenosis.  She has occasional urinary incontinence.  She denies any upper extremity weakness but states her legs cannot support  her.       Hospital Course     Patient admitted to hospital start IV Rocephin and IV fluids.  Neurology consultation was obtained because of persistent lower extremity weakness.  MRI lumbar spine showed severe degenerative disc disease lumbar spinal stenosis.  Attempts were made to obtain a cervical and thoracic MRI but these were unsuccessful in field for less than optimal and that showed multilevel degenerative disc disease and thoracic spine.  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    Renal function stabilized.  IV fluids were discontinued.  Urine cultures came back with E. coli sensitive to oral antibiotics and she was changed from IV Rocephin to p.o. Keflex which she completed time of discharge.    Her blood sugars were controlled during her hospitalization.  Pressures are also stable.     Patient started physical therapy and was progressing and was walking 20 feet with assistance.  She still was not at baseline.  She was transferred to [facility] for continued rehab.      Operations and Procedures Performed:       MRI Thoracic Spine Without Contrast    Result Date: 5/27/2022  Narrative: MRI Spine Thoracic WO HISTORY: Chronic back pain, severe over the last 3 weeks with leg spasms TECHNIQUE: Multiplanar multisequence imaging of the thoracic spine acquired without IV contrast. COMPARISON: *  MRI lumbar spine May 26, 2022 FINDINGS: Per the performing technologist, images are significantly motion degraded and multiple attempts and repeat acquisitions were performed over the course of the scan in an attempt to decrease the degree of motion degradation. Despite this, there is severe motion degradation on most sequences and this severely hampers evaluation. Evaluation for cord signal abnormality is precluded by this exam. No large listhesis is identified. Mild rightward convexity curvature of the thoracic spine. Vertebral body  heights appear grossly maintained. Evaluation for marrow signal abnormalities is markedly degraded but no large aggressive marrow lesion is identified but any non-large marrow lesion would not be detected by this study. Multilevel degenerative endplate change with loss of disc height at mid thoracic spine levels. Most notably there are disc bulges at T3-4, T5-6 and C7-T1. Lesser degree of disc bulge at T9-10, T10-T11 and T11-T12. There is likely mild to moderate thecal sac narrowing at T3-T4. Central disc protrusion with mild cord indentation and thecal sac narrowing at T5-T6. Diffuse disc bulge with moderate circumferential thecal sac narrowing at T10-T11. Multilevel facet degeneration. There is likely severe bilateral foraminal narrowing at T10-T11 and on the left T8-T9, T7-T8, T6-T7 though evaluation of foramina is markedly degraded by motion. Evaluation of foramina overall is suboptimal. Localizer images demonstrate severe degenerative changes in the cervical spine, especially at C4-C5 where there is cord indentation and spinal canal narrowing which is inadequately assessed.     Impression: 1.  Markedly motion degraded exam but with multilevel degenerative changes including disc bulges, disc protrusions and facet degeneration with no severe spinal canal narrowing convincingly seen however, sometimes severe foraminal narrowing as best described though evaluation of foramina is suboptimal by this study. Details are as above. 2.  Severe degenerative changes in the cervical spine especially at C4-C5 seen only on localizer imaging and inadequately assessed by this study. Signer Name: RAMY CAMPOS MD  Signed: 5/27/2022 2:52 PM  Workstation Name: [email or web address]  [facility]    MRI Lumbar Spine Without Contrast    Result Date: 5/26/2022  Narrative: MRI Spine Lumbar WO INDICATION:  Severe low back pain with leg cramps over the last 3 weeks. Multiple falls. TECHNIQUE: MRI of the lumbar spine without IV  contrast. Examination degraded due to motion as the patient had repeated muscle spasms. COMPARISON:  None available. FINDINGS: Grade 1 (5 mm) spondylolisthesis L4-L5 which appears degenerative. No fracture or aggressive bone lesions. Conus medullaris unremarkable. Mild thickening and clumping of the descending nerve roots could represent mild arachnoiditis possibly related to spinal stenosis. Paravertebral soft tissues unremarkable. L1-L2: Degenerative disc disease with disc space narrowing. Circumferential disc bulge. In combination with facet arthropathy moderate central canal stenosis and mild bilateral foraminal stenosis. L2-L3: Disc desiccation. Circumferential disc bulge. In combination with facet arthropathy mild central canal stenosis and mild bilateral foraminal stenosis. L3-L4: Disc desiccation and disc space narrowing. Circumferential disc bulge. In combination with facet arthropathy moderate central canal stenosis and mild-to-moderate right foraminal stenosis. L4-L5: Disc desiccation and disc space narrowing. Circumferential disc bulge. Moderate to severe central canal stenosis due to combination of spondylolisthesis, facet arthropathy and disc bulge. Severe left-sided foraminal stenosis. L5-S1: Disc spaces maintained. Minimal posterior disc bulging but no significant central canal stenosis. Mild bilateral foraminal stenosis. Bilateral facet arthropathy.     Impression: Study technically limited due to significant motion artifact particularly on the axial images. Moderate to severe L4-L5 spinal stenosis due to a combination of spondylolisthesis, facet arthropathy and circumferential disc bulge. Severe left-sided L4-L5 foraminal stenosis. See above for level by level details. Mild thickening and clumping of the descending nerve roots could represent sterile arachnoiditis possibly on the basis of spinal stenosis. Signer Name: ACACIA Sanchez MD  Signed: 5/26/2022 9:52 AM  Workstation Name: [email or web address]   [facility]      Labs:  Results from last 7 days   Lab Units 05/29/22  0335 05/27/22  0337   WBC 10*3/mm3 7.56 6.24   HEMOGLOBIN g/dL 11.6* 11.0*   HEMATOCRIT % 36.9 35.1   PLATELETS 10*3/mm3 202 204     Results from last 7 days   Lab Units 05/29/22  0335 05/27/22  0337   SODIUM mmol/L 138 141   POTASSIUM mmol/L 3.7 3.6   CHLORIDE mmol/L 103 105   CO2 mmol/L 20.2* 24.1   BUN mg/dL 21 28*   CREATININE mg/dL 1.20* 1.25*   CALCIUM mg/dL 9.6 9.0   GLUCOSE mg/dL 98 103*           Lab Results (last 24 hours)     ** No results found for the last 24 hours. **                                  Invalid input(s): LDLCALC          No results found for: POCGLU                      Radiology:  Imaging Results (Last 24 Hours)     ** No results found for the last 24 hours. **          PROCEDURES          Allergies:  is allergic to dilantin [phenytoin].      Discharge Medications:     Your medication list      START taking these medications      Instructions Last Dose Given Next Dose Due   acetaminophen 325 MG tablet  Commonly known as: TYLENOL      Take 2 tablets by mouth Every 4 (Four) Hours As Needed for Mild Pain .       bisacodyl 5 MG EC tablet  Commonly known as: DULCOLAX      Take 1 tablet by mouth Daily As Needed for Constipation (Use if polyethylene glycol is ineffective).       bisacodyl 10 MG suppository  Commonly known as: DULCOLAX      Insert 1 suppository into the rectum Daily As Needed for Constipation (Use if bisacodyl oral is ineffective).       clonazePAM 0.5 MG tablet  Commonly known as: KlonoPIN      Take 1 tablet by mouth 2 (Two) Times a Day for 1 dose.       polyethylene glycol 17 g packet  Commonly known as: MIRALAX      Take 17 g by mouth Daily As Needed (Use if senna-docusate is ineffective).       sennosides-docusate 8.6-50 MG per tablet  Commonly known as: PERICOLACE      Take 2 tablets by mouth 2 (Two) Times a Day.          CHANGE how you take these medications      Instructions  Last Dose Given Next Dose Due   gabapentin 300 MG capsule  Commonly known as: NEURONTIN  What changed:   · how much to take  · when to take this      Take 2 capsules by mouth Every Night.          CONTINUE taking these medications      Instructions Last Dose Given Next Dose Due   aspirin 81 MG chewable tablet      Chew 81 mg Daily.       atenolol 50 MG tablet  Commonly known as: TENORMIN      Take 50 mg by mouth Daily.       coenzyme Q10 100 MG capsule      Take 100 mg by mouth Daily.       cyclobenzaprine 10 MG tablet  Commonly known as: FLEXERIL      Take 10 mg by mouth 3 (Three) Times a Day As Needed for Muscle Spasms.       HYDROcodone-acetaminophen 7.5-325 MG per tablet  Commonly known as: NORCO      Take 1 tablet by mouth Every 8 (Eight) Hours As Needed for Moderate Pain .       levothyroxine 100 MCG tablet  Commonly known as: SYNTHROID, LEVOTHROID      Take 150 mcg by mouth Daily.       Magnesium 250 MG tablet      Take 1 each by mouth.       metFORMIN  MG 24 hr tablet  Commonly known as: GLUCOPHAGE-XR      Take 750 mg by mouth Daily With Breakfast.       naproxen sodium 220 MG tablet  Commonly known as: ALEVE      Take 220 mg by mouth 2 (Two) Times a Day As Needed.       Omega-3 1000 MG capsule      Take 1 capsule by mouth.       oxybutynin 5 MG tablet  Commonly known as: DITROPAN      Take 5 mg by mouth 4 (Four) Times a Day.          STOP taking these medications    chlorthalidone 25 MG tablet  Commonly known as: HYGROTON              Where to Get Your Medications      You can get these medications from any pharmacy    Bring a paper prescription for each of these medications  · clonazePAM 0.5 MG tablet  · gabapentin 300 MG capsule  · HYDROcodone-acetaminophen 7.5-325 MG per tablet     Information about where to get these medications is not yet available    Ask your nurse or doctor about these medications  · acetaminophen 325 MG tablet  · bisacodyl 10 MG suppository  · bisacodyl 5 MG EC  tablet  · polyethylene glycol 17 g packet  · sennosides-docusate 8.6-50 MG per tablet       Physical/occupational therapy to be continued at rehab    Weekly BMP on Mondays monthly CBC and CMP    Weekly weights    Home medications and discharge medications reconciled with patient      Condition on Discharge: Stable    Discharge Disposition  [facility]        Discharge Diet:      Dietary Orders (From admission, onward)     Start     Ordered    05/26/22 0658  Diet Regular; Cardiac, Consistent Carbohydrate  Diet Effective Now        Question Answer Comment   Diet Texture / Consistency Regular    Common Modifiers Cardiac    Common Modifiers Consistent Carbohydrate        05/26/22 0659                      Follow-up Appointments:   Follow-up Information     Tiffany Trejo MD .    Specialty: Family Medicine  Contact information:  [address] [facility]   [address]  [place] [address] [phone]                         Test Results Pending at Discharge       Tiffany Trejo MD  06/02/22  07:36 EDT          Much of this encounter note is an electronic transcription/translation of spoken language to printed text. The electronic translation of spoken language may permit erroneous, or at times, nonsensical words or phrases to be inadvertently transcribed; Although I have reviewed the note for such errors, some may still exist.    As of April 2021, as required by the Federal 21st Century Cures Act, medical records (including provider notes and laboratory/imaging results) are to be made available to patients and/or their designees as soon as the documents are signed/resulted. While the intention is to ensure transparency and to engage patients in their healthcare, this immediate access may create unintended consequences because this document uses language intended for communication between medical providers for interpretation with the entirety of the patient's clinical picture in mind. It is recommended  that patients and/or their designees review all available information with their primary or specialist providers for explanation and to avoid misinterpretation of this information

## 2022-06-02 NOTE — PLAN OF CARE
Goal Outcome Evaluation:           Progress: improving  Outcome Evaluation: pt did well overnight. still having significant neck & R hip pain; PRN flexiril & norco 7.5 mg given. ambulated to bedside commode once this shift, assist x2. pt had large, formed BM. ready for D/C to [facility] today.

## 2022-06-02 NOTE — CASE MANAGEMENT/SOCIAL WORK
Continued Stay Note  JOE Antoine     Patient Name: Sherri Sams  MRN: [id number]  Today's Date: 6/2/2022    Admit Date: 5/26/2022     Discharge Plan     Row Name 06/02/22 0857       Plan    Plan [facility]    Plan Comments Patient noted for discharge. CM called and spoke with Deysi at [facility] and informed her that patient was being discharged today. She states that she was not aware patient was coming today and she is on her way into the facility to assign her a bed and that the nurse will call for report when they are ready. CM also asked if they could use the van to  the patient and she states she will find this out and let me know. CM updated primary nurse Royer of such. CM will continue to follow for needs.               Discharge Codes    No documentation.               Expected Discharge Date and Time     Expected Discharge Date Expected Discharge Time    Jun 2, 2022             Ny Cm RN

## 2022-06-03 ENCOUNTER — LAB REQUISITION (OUTPATIENT)
Dept: LAB | Facility: HOSPITAL | Age: 67
End: 2022-06-03

## 2022-06-03 DIAGNOSIS — R06.02 SHORTNESS OF BREATH: ICD-10-CM

## 2022-06-03 LAB
FLUAV RNA RESP QL NAA+PROBE: NOT DETECTED
FLUBV RNA RESP QL NAA+PROBE: NOT DETECTED
SARS-COV-2 RNA RESP QL NAA+PROBE: DETECTED

## 2022-06-03 PROCEDURE — 87636 SARSCOV2 & INF A&B AMP PRB: CPT | Performed by: FAMILY MEDICINE

## 2022-06-04 NOTE — CASE MANAGEMENT/SOCIAL WORK
Case Management Discharge Note      Final Note: [facility]    Provided Post Acute Provider List?: Yes  Post Acute Provider List: Home Health, Inpatient Rehab, Nursing Home  Provided Post Acute Provider Quality & Resource List?: Yes  Post Acute Provider Quality and Resource List: Home Health, Inpatient Rehab, Nursing Home  Delivered To: Patient  Method of Delivery: In person    Selected Continued Care - Discharged on 6/2/2022 Admission date: 5/26/2022 - Discharge disposition: Skilled Nursing Facility (DC - External)    Destination Coordination complete.    Service Provider Selected Services Address Phone Fax Patient Preferred    [facility] [address] [address] [phone] --          Durable Medical Equipment    No services have been selected for the patient.              Dialysis/Infusion    No services have been selected for the patient.              Home Medical Care    No services have been selected for the patient.              Therapy    No services have been selected for the patient.              Community Resources    No services have been selected for the patient.              Community & DME    No services have been selected for the patient.                       Final Discharge Disposition Code: 03 - skilled nursing facility (SNF)

## 2022-06-06 ENCOUNTER — HOSPITAL ENCOUNTER (OUTPATIENT)
Dept: INFUSION THERAPY | Facility: HOSPITAL | Age: 67
Discharge: HOME | End: 2022-06-06
Admitting: FAMILY MEDICINE

## 2022-06-06 VITALS
BODY MASS INDEX: 39.99 KG/M2 | TEMPERATURE: 97.3 F | SYSTOLIC BLOOD PRESSURE: 102 MMHG | DIASTOLIC BLOOD PRESSURE: 70 MMHG | OXYGEN SATURATION: 96 % | WEIGHT: 240 LBS | HEART RATE: 80 BPM | RESPIRATION RATE: 18 BRPM | HEIGHT: 65 IN

## 2022-06-06 DIAGNOSIS — U07.1 COVID: Primary | ICD-10-CM

## 2022-06-06 PROCEDURE — 25010000002 INJECTION, BEBTELOVIMAB, 175 MG: Performed by: FAMILY MEDICINE

## 2022-06-06 PROCEDURE — M0222 HC INJECTION BEBTELOVIMAB: HCPCS | Performed by: FAMILY MEDICINE

## 2022-06-06 RX ORDER — BEBTELOVIMAB 87.5 MG/ML
175 INJECTION, SOLUTION INTRAVENOUS ONCE
Status: COMPLETED | OUTPATIENT
Start: 2022-06-06 | End: 2022-06-06

## 2022-06-06 RX ORDER — METHYLPREDNISOLONE SODIUM SUCCINATE 125 MG/2ML
125 INJECTION, POWDER, LYOPHILIZED, FOR SOLUTION INTRAMUSCULAR; INTRAVENOUS ONCE AS NEEDED
Status: DISCONTINUED | OUTPATIENT
Start: 2022-06-06 | End: 2022-06-08 | Stop reason: HOSPADM

## 2022-06-06 RX ORDER — DIPHENHYDRAMINE HYDROCHLORIDE 50 MG/ML
50 INJECTION INTRAMUSCULAR; INTRAVENOUS ONCE AS NEEDED
Status: DISCONTINUED | OUTPATIENT
Start: 2022-06-06 | End: 2022-06-08 | Stop reason: HOSPADM

## 2022-06-06 RX ORDER — SODIUM CHLORIDE 9 MG/ML
30 INJECTION, SOLUTION INTRAVENOUS ONCE
Status: DISCONTINUED | OUTPATIENT
Start: 2022-06-06 | End: 2022-06-08 | Stop reason: HOSPADM

## 2022-06-06 RX ORDER — EPINEPHRINE 1 MG/ML
0.3 INJECTION, SOLUTION INTRAMUSCULAR; SUBCUTANEOUS ONCE AS NEEDED
Status: DISCONTINUED | OUTPATIENT
Start: 2022-06-06 | End: 2022-06-08 | Stop reason: HOSPADM

## 2022-06-06 RX ORDER — DIPHENHYDRAMINE HCL 50 MG
50 CAPSULE ORAL ONCE AS NEEDED
Status: DISCONTINUED | OUTPATIENT
Start: 2022-06-06 | End: 2022-06-08 | Stop reason: HOSPADM

## 2022-06-06 RX ADMIN — BEBTELOVIMAB 175 MG: 87.5 INJECTION, SOLUTION INTRAVENOUS at 15:21

## 2022-06-06 NOTE — PATIENT INSTRUCTIONS
"Call Dr. Tiffany Trejo, [facility] at [phone] if you have any problems or concerns.    We know you have a Choice in healthcare and appreciate you using [facility].  Our purpose is to provide you \"Excellent Care\".  We hope that you will always choose us in the future and continue to recommend us to your family and friends.     "

## 2022-06-06 NOTE — NURSING NOTE
Spoke with John on 6/6/2022 at 0825 regarding orders for Monoclonal.  John stated patient has no symptoms (no fever, cough and room air SATS are 98%).  Stated does not need infusion at this time. RATNA Salazar

## 2022-06-06 NOTE — NURSING NOTE
1500  Pt here per wheelchair with assist from [facility].  Pt scheduled for Bebtelovimab infusion. Pt given EUA info sheet on med and agreeable to receive med.  Pt wants to stay in her wheelchair.  Water given.  Pt placed on monitors.  1632  Pt discharged per wheelchair with AVS papers and EUA papers.  Pt taken to exit door to meet [place] assist.  Pt nancy med without any adverse effects.  Pt denies any probs.  VSS.

## 2022-06-20 ENCOUNTER — OFFICE VISIT (OUTPATIENT)
Dept: ORTHOPEDIC SURGERY | Facility: CLINIC | Age: 67
End: 2022-06-20

## 2022-06-20 VITALS
HEIGHT: 65 IN | WEIGHT: 240 LBS | DIASTOLIC BLOOD PRESSURE: 73 MMHG | SYSTOLIC BLOOD PRESSURE: 120 MMHG | BODY MASS INDEX: 39.99 KG/M2 | HEART RATE: 74 BPM

## 2022-06-20 DIAGNOSIS — M85.80 BONE EROSION: ICD-10-CM

## 2022-06-20 DIAGNOSIS — M17.12 PRIMARY OSTEOARTHRITIS OF LEFT KNEE: Primary | ICD-10-CM

## 2022-06-20 PROCEDURE — 99203 OFFICE O/P NEW LOW 30 MIN: CPT | Performed by: ORTHOPAEDIC SURGERY

## 2022-07-21 ENCOUNTER — HOSPITAL ENCOUNTER (OUTPATIENT)
Dept: MRI IMAGING | Facility: HOSPITAL | Age: 67
Discharge: HOME | End: 2022-07-21
Admitting: ORTHOPAEDIC SURGERY

## 2022-07-21 DIAGNOSIS — M85.80 BONE EROSION: ICD-10-CM

## 2022-07-21 DIAGNOSIS — M17.12 PRIMARY OSTEOARTHRITIS OF LEFT KNEE: ICD-10-CM

## 2022-07-21 PROCEDURE — 73721 MRI JNT OF LWR EXTRE W/O DYE: CPT

## 2022-08-05 ENCOUNTER — LAB REQUISITION (OUTPATIENT)
Dept: LAB | Facility: HOSPITAL | Age: 67
End: 2022-08-05

## 2022-08-05 DIAGNOSIS — R30.0 DYSURIA: ICD-10-CM

## 2022-08-05 LAB
BACTERIA UR QL AUTO: ABNORMAL /HPF
BILIRUB UR QL STRIP: NEGATIVE
CLARITY UR: ABNORMAL
COLOR UR: ABNORMAL
GLUCOSE UR STRIP-MCNC: NEGATIVE MG/DL
HGB UR QL STRIP.AUTO: ABNORMAL
HYALINE CASTS UR QL AUTO: ABNORMAL /LPF
KETONES UR QL STRIP: NEGATIVE
LEUKOCYTE ESTERASE UR QL STRIP.AUTO: ABNORMAL
NITRITE UR QL STRIP: POSITIVE
PH UR STRIP.AUTO: 7.5 [PH] (ref 4.5–8)
PROT UR QL STRIP: NEGATIVE
RBC # UR STRIP: ABNORMAL /HPF
REF LAB TEST METHOD: ABNORMAL
SP GR UR STRIP: 1.01 (ref 1–1.03)
SQUAMOUS #/AREA URNS HPF: ABNORMAL /HPF
UROBILINOGEN UR QL STRIP: ABNORMAL
WBC # UR STRIP: ABNORMAL /HPF

## 2022-08-05 PROCEDURE — 87186 SC STD MICRODIL/AGAR DIL: CPT | Performed by: FAMILY MEDICINE

## 2022-08-05 PROCEDURE — 81001 URINALYSIS AUTO W/SCOPE: CPT | Performed by: FAMILY MEDICINE

## 2022-08-05 PROCEDURE — 87077 CULTURE AEROBIC IDENTIFY: CPT | Performed by: FAMILY MEDICINE

## 2022-08-05 PROCEDURE — 87086 URINE CULTURE/COLONY COUNT: CPT | Performed by: FAMILY MEDICINE

## 2022-08-07 LAB — BACTERIA SPEC AEROBE CULT: ABNORMAL

## 2022-08-29 ENCOUNTER — OFFICE VISIT (OUTPATIENT)
Dept: ORTHOPEDIC SURGERY | Facility: CLINIC | Age: 67
End: 2022-08-29

## 2022-08-29 ENCOUNTER — LAB (OUTPATIENT)
Dept: LAB | Facility: HOSPITAL | Age: 67
End: 2022-08-29

## 2022-08-29 VITALS — WEIGHT: 240 LBS | HEIGHT: 65 IN | BODY MASS INDEX: 39.99 KG/M2

## 2022-08-29 DIAGNOSIS — M17.12 PRIMARY OSTEOARTHRITIS OF LEFT KNEE: ICD-10-CM

## 2022-08-29 DIAGNOSIS — M17.12 PRIMARY OSTEOARTHRITIS OF LEFT KNEE: Primary | ICD-10-CM

## 2022-08-29 LAB
CRP SERPL-MCNC: <0.3 MG/DL (ref 0–0.5)
ERYTHROCYTE [SEDIMENTATION RATE] IN BLOOD: 13 MM/HR (ref 0–30)

## 2022-08-29 PROCEDURE — 85652 RBC SED RATE AUTOMATED: CPT

## 2022-08-29 PROCEDURE — 36415 COLL VENOUS BLD VENIPUNCTURE: CPT

## 2022-08-29 PROCEDURE — 86140 C-REACTIVE PROTEIN: CPT

## 2022-08-29 PROCEDURE — 99213 OFFICE O/P EST LOW 20 MIN: CPT | Performed by: ORTHOPAEDIC SURGERY

## 2022-08-29 RX ORDER — METHOCARBAMOL 750 MG/1
TABLET, FILM COATED ORAL
COMMUNITY
Start: 2022-08-23

## 2023-05-25 ENCOUNTER — TRANSCRIBE ORDERS (OUTPATIENT)
Dept: ADMINISTRATIVE | Facility: HOSPITAL | Age: 68
End: 2023-05-25

## 2023-05-25 DIAGNOSIS — Z12.31 VISIT FOR SCREENING MAMMOGRAM: Primary | ICD-10-CM

## 2023-06-02 ENCOUNTER — HOSPITAL ENCOUNTER (OUTPATIENT)
Dept: MAMMOGRAPHY | Facility: HOSPITAL | Age: 68
Discharge: HOME | End: 2023-06-02
Admitting: FAMILY MEDICINE

## 2023-06-02 DIAGNOSIS — Z12.31 VISIT FOR SCREENING MAMMOGRAM: ICD-10-CM

## 2023-06-02 PROCEDURE — 77067 SCR MAMMO BI INCL CAD: CPT

## 2023-06-02 PROCEDURE — 77063 BREAST TOMOSYNTHESIS BI: CPT

## 2024-03-05 ENCOUNTER — TRANSCRIBE ORDERS (OUTPATIENT)
Dept: BONE DENSITY | Facility: HOSPITAL | Age: 69
End: 2024-03-05
Payer: MEDICARE

## 2024-03-05 DIAGNOSIS — Z78.0 MENOPAUSE: Primary | ICD-10-CM

## 2024-03-29 ENCOUNTER — HOSPITAL ENCOUNTER (OUTPATIENT)
Dept: BONE DENSITY | Facility: HOSPITAL | Age: 69
Discharge: HOME | End: 2024-03-29
Payer: MEDICARE

## 2024-03-29 DIAGNOSIS — Z78.0 MENOPAUSE: ICD-10-CM

## 2024-03-29 PROCEDURE — 77080 DXA BONE DENSITY AXIAL: CPT
